# Patient Record
Sex: FEMALE | Race: BLACK OR AFRICAN AMERICAN | NOT HISPANIC OR LATINO | URBAN - METROPOLITAN AREA
[De-identification: names, ages, dates, MRNs, and addresses within clinical notes are randomized per-mention and may not be internally consistent; named-entity substitution may affect disease eponyms.]

---

## 2019-07-08 ENCOUNTER — EMERGENCY (EMERGENCY)
Facility: HOSPITAL | Age: 64
LOS: 0 days | Discharge: HOME | End: 2019-07-08
Attending: EMERGENCY MEDICINE
Payer: COMMERCIAL

## 2019-07-08 VITALS
TEMPERATURE: 98 F | RESPIRATION RATE: 20 BRPM | DIASTOLIC BLOOD PRESSURE: 68 MMHG | SYSTOLIC BLOOD PRESSURE: 153 MMHG | OXYGEN SATURATION: 99 % | HEART RATE: 81 BPM

## 2019-07-08 VITALS
WEIGHT: 270.07 LBS | HEART RATE: 87 BPM | TEMPERATURE: 100 F | SYSTOLIC BLOOD PRESSURE: 174 MMHG | DIASTOLIC BLOOD PRESSURE: 75 MMHG | RESPIRATION RATE: 24 BRPM | HEIGHT: 65 IN | OXYGEN SATURATION: 100 %

## 2019-07-08 DIAGNOSIS — Z91.013 ALLERGY TO SEAFOOD: ICD-10-CM

## 2019-07-08 DIAGNOSIS — R06.02 SHORTNESS OF BREATH: ICD-10-CM

## 2019-07-08 DIAGNOSIS — R09.89 OTHER SPECIFIED SYMPTOMS AND SIGNS INVOLVING THE CIRCULATORY AND RESPIRATORY SYSTEMS: ICD-10-CM

## 2019-07-08 DIAGNOSIS — J45.901 UNSPECIFIED ASTHMA WITH (ACUTE) EXACERBATION: ICD-10-CM

## 2019-07-08 LAB
ALBUMIN SERPL ELPH-MCNC: 3.5 G/DL — SIGNIFICANT CHANGE UP (ref 3.3–5)
ALP SERPL-CCNC: 119 U/L — SIGNIFICANT CHANGE UP (ref 40–120)
ALT FLD-CCNC: 24 U/L — SIGNIFICANT CHANGE UP (ref 12–78)
ANION GAP SERPL CALC-SCNC: 5 MMOL/L — SIGNIFICANT CHANGE UP (ref 5–17)
AST SERPL-CCNC: 22 U/L — SIGNIFICANT CHANGE UP (ref 15–37)
BASOPHILS # BLD AUTO: 0.09 K/UL — SIGNIFICANT CHANGE UP (ref 0–0.2)
BASOPHILS NFR BLD AUTO: 0.9 % — SIGNIFICANT CHANGE UP (ref 0–2)
BILIRUB SERPL-MCNC: 0.4 MG/DL — SIGNIFICANT CHANGE UP (ref 0.2–1.2)
BUN SERPL-MCNC: 10 MG/DL — SIGNIFICANT CHANGE UP (ref 7–23)
CALCIUM SERPL-MCNC: 9.3 MG/DL — SIGNIFICANT CHANGE UP (ref 8.5–10.1)
CHLORIDE SERPL-SCNC: 100 MMOL/L — SIGNIFICANT CHANGE UP (ref 96–108)
CO2 SERPL-SCNC: 33 MMOL/L — HIGH (ref 22–31)
CREAT SERPL-MCNC: 0.76 MG/DL — SIGNIFICANT CHANGE UP (ref 0.5–1.3)
EOSINOPHIL # BLD AUTO: 0.51 K/UL — HIGH (ref 0–0.5)
EOSINOPHIL NFR BLD AUTO: 5.2 % — SIGNIFICANT CHANGE UP (ref 0–6)
GLUCOSE SERPL-MCNC: 155 MG/DL — HIGH (ref 70–99)
HCT VFR BLD CALC: 41.7 % — SIGNIFICANT CHANGE UP (ref 34.5–45)
HGB BLD-MCNC: 13.1 G/DL — SIGNIFICANT CHANGE UP (ref 11.5–15.5)
IMM GRANULOCYTES NFR BLD AUTO: 0.6 % — SIGNIFICANT CHANGE UP (ref 0–1.5)
LYMPHOCYTES # BLD AUTO: 1.62 K/UL — SIGNIFICANT CHANGE UP (ref 1–3.3)
LYMPHOCYTES # BLD AUTO: 16.5 % — SIGNIFICANT CHANGE UP (ref 13–44)
MCHC RBC-ENTMCNC: 27.8 PG — SIGNIFICANT CHANGE UP (ref 27–34)
MCHC RBC-ENTMCNC: 31.4 GM/DL — LOW (ref 32–36)
MCV RBC AUTO: 88.5 FL — SIGNIFICANT CHANGE UP (ref 80–100)
MONOCYTES # BLD AUTO: 0.3 K/UL — SIGNIFICANT CHANGE UP (ref 0–0.9)
MONOCYTES NFR BLD AUTO: 3.1 % — SIGNIFICANT CHANGE UP (ref 2–14)
NEUTROPHILS # BLD AUTO: 7.22 K/UL — SIGNIFICANT CHANGE UP (ref 1.8–7.4)
NEUTROPHILS NFR BLD AUTO: 73.7 % — SIGNIFICANT CHANGE UP (ref 43–77)
NRBC # BLD: 0 /100 WBCS — SIGNIFICANT CHANGE UP (ref 0–0)
NT-PROBNP SERPL-SCNC: 85 PG/ML — SIGNIFICANT CHANGE UP (ref 0–125)
PLATELET # BLD AUTO: 202 K/UL — SIGNIFICANT CHANGE UP (ref 150–400)
POTASSIUM SERPL-MCNC: 3.7 MMOL/L — SIGNIFICANT CHANGE UP (ref 3.5–5.3)
POTASSIUM SERPL-SCNC: 3.7 MMOL/L — SIGNIFICANT CHANGE UP (ref 3.5–5.3)
PROT SERPL-MCNC: 8.7 GM/DL — HIGH (ref 6–8.3)
RBC # BLD: 4.71 M/UL — SIGNIFICANT CHANGE UP (ref 3.8–5.2)
RBC # FLD: 14.1 % — SIGNIFICANT CHANGE UP (ref 10.3–14.5)
SODIUM SERPL-SCNC: 138 MMOL/L — SIGNIFICANT CHANGE UP (ref 135–145)
TROPONIN I SERPL-MCNC: <.015 NG/ML — SIGNIFICANT CHANGE UP (ref 0.01–0.04)
WBC # BLD: 9.8 K/UL — SIGNIFICANT CHANGE UP (ref 3.8–10.5)
WBC # FLD AUTO: 9.8 K/UL — SIGNIFICANT CHANGE UP (ref 3.8–10.5)

## 2019-07-08 PROCEDURE — 93010 ELECTROCARDIOGRAM REPORT: CPT

## 2019-07-08 PROCEDURE — 99285 EMERGENCY DEPT VISIT HI MDM: CPT

## 2019-07-08 PROCEDURE — 71045 X-RAY EXAM CHEST 1 VIEW: CPT | Mod: 26

## 2019-07-08 RX ORDER — FLUTICASONE PROPIONATE AND SALMETEROL 50; 250 UG/1; UG/1
2 POWDER ORAL; RESPIRATORY (INHALATION)
Qty: 1 | Refills: 0
Start: 2019-07-08 | End: 2019-08-06

## 2019-07-08 RX ORDER — MAGNESIUM SULFATE 500 MG/ML
2 VIAL (ML) INJECTION ONCE
Refills: 0 | Status: COMPLETED | OUTPATIENT
Start: 2019-07-08 | End: 2019-07-08

## 2019-07-08 RX ORDER — IPRATROPIUM/ALBUTEROL SULFATE 18-103MCG
3 AEROSOL WITH ADAPTER (GRAM) INHALATION ONCE
Refills: 0 | Status: COMPLETED | OUTPATIENT
Start: 2019-07-08 | End: 2019-07-08

## 2019-07-08 RX ORDER — ALBUTEROL 90 UG/1
2 AEROSOL, METERED ORAL
Qty: 1 | Refills: 0
Start: 2019-07-08 | End: 2019-08-06

## 2019-07-08 RX ORDER — IPRATROPIUM/ALBUTEROL SULFATE 18-103MCG
3 AEROSOL WITH ADAPTER (GRAM) INHALATION
Refills: 0 | Status: COMPLETED | OUTPATIENT
Start: 2019-07-08 | End: 2019-07-08

## 2019-07-08 RX ORDER — ALBUTEROL 90 UG/1
3 AEROSOL, METERED ORAL
Qty: 540 | Refills: 0
Start: 2019-07-08 | End: 2019-08-06

## 2019-07-08 RX ADMIN — Medication 125 MILLIGRAM(S): at 11:06

## 2019-07-08 RX ADMIN — Medication 3 MILLILITER(S): at 14:39

## 2019-07-08 RX ADMIN — Medication 50 GRAM(S): at 11:12

## 2019-07-08 RX ADMIN — Medication 3 MILLILITER(S): at 11:29

## 2019-07-08 RX ADMIN — Medication 2 GRAM(S): at 12:00

## 2019-07-08 RX ADMIN — Medication 3 MILLILITER(S): at 11:43

## 2019-07-08 RX ADMIN — Medication 3 MILLILITER(S): at 11:02

## 2019-07-08 RX ADMIN — Medication 3 MILLILITER(S): at 14:18

## 2019-07-08 NOTE — ED PROVIDER NOTE - PROGRESS NOTE DETAILS
pt still with wheezing but improved, pt in no resp distress. able to walk entire ER without significant distress and O2 93-94%.

## 2019-07-08 NOTE — ED PROVIDER NOTE - CLINICAL SUMMARY MEDICAL DECISION MAKING FREE TEXT BOX
pt nontoxic, feeling better, wheezign much more improved, sat 95%.  pt given option for admission but does not want to stay. pt has nebulizer at home.

## 2019-07-08 NOTE — ED PROVIDER NOTE - PHYSICAL EXAMINATION
Gen: Alert, Well appearing. speaking full sentences  Head: NC, AT, PERRL, normal lids/conjunctiva   ENT: Bilateral TM WNL, patent oropharynx without erythema/exudate, uvula midline  Neck: supple, no tenderness/meningismus  Pulm: + diffuse wheeze/rhonchi  CV: RRR, no M/R/G, +dist pulses   Abd: soft, NT/ND, +BS, no guarding/rebound tenderness  Mskel: no edema/erythema/cyanosis   Skin: no rash, no bruising  Neuro: AAOx3, no sensory/motor deficits, CN 2-12 intact

## 2019-07-08 NOTE — ED ADULT NURSE NOTE - OBJECTIVE STATEMENT
BIBA for shortness of breath and wheezing since this am@ her MD's office and given multiple duoneb treatments with persistant SOB continues and bilateral wheezes noted with a  history of asthma,

## 2019-07-08 NOTE — ED PROVIDER NOTE - NSFOLLOWUPINSTRUCTIONS_ED_ALL_ED_FT
Follow up with your primary care doctor within the next 24-48 hours and bring copy of your results.  Return to the Emergency Department for worsening or persistent symptoms or any other concerns incl. chest pain, shortness of breath, dizziness, inability to tolerate oral intake.   Advance activity as tolerated.  Continue all previously prescribed medications as directed.

## 2019-07-08 NOTE — ED PROVIDER NOTE - OBJECTIVE STATEMENT
68yo female with pmh asthma (no ICU, no recent admissions), htn, presents with initial URI/congestion 4-5 days ago and sob but unable to see doctor as they were closed. taken otc mucinex and inhaler. however more sob with some yellow sputum and sent in by PMD. no fever, cp, palpitations, NV.     ROS: No fever/chills. No photophobia/eye pain/changes in vision, No ear pain/sore throat/dysphagia, No chest pain/palpitations. + SOB/cough. No abdominal pain, No N/V/D, no black/bloody bm. No dysuria/frequency/discharge, No headache. No Dizziness.  No rash.  No numbness/tingling/weakness.

## 2023-07-09 ENCOUNTER — NON-APPOINTMENT (OUTPATIENT)
Age: 68
End: 2023-07-09

## 2023-09-01 ENCOUNTER — INPATIENT (INPATIENT)
Facility: HOSPITAL | Age: 68
LOS: 1 days | Discharge: HOME | DRG: 202 | End: 2023-09-03
Attending: INTERNAL MEDICINE | Admitting: INTERNAL MEDICINE
Payer: MEDICARE

## 2023-09-01 VITALS
RESPIRATION RATE: 24 BRPM | DIASTOLIC BLOOD PRESSURE: 81 MMHG | SYSTOLIC BLOOD PRESSURE: 156 MMHG | HEART RATE: 108 BPM | TEMPERATURE: 98 F | HEIGHT: 65 IN | WEIGHT: 203.93 LBS | OXYGEN SATURATION: 92 %

## 2023-09-01 LAB
ALBUMIN SERPL ELPH-MCNC: 4 G/DL — SIGNIFICANT CHANGE UP (ref 3.3–5)
ALP SERPL-CCNC: 97 U/L — SIGNIFICANT CHANGE UP (ref 40–120)
ALT FLD-CCNC: 16 U/L — SIGNIFICANT CHANGE UP (ref 10–45)
ANION GAP SERPL CALC-SCNC: 13 MMOL/L — SIGNIFICANT CHANGE UP (ref 5–17)
AST SERPL-CCNC: 19 U/L — SIGNIFICANT CHANGE UP (ref 10–40)
BASE EXCESS BLDV CALC-SCNC: 11.7 MMOL/L — HIGH (ref -2–3)
BASOPHILS # BLD AUTO: 0.1 K/UL — SIGNIFICANT CHANGE UP (ref 0–0.2)
BASOPHILS NFR BLD AUTO: 1.3 % — SIGNIFICANT CHANGE UP (ref 0–2)
BILIRUB SERPL-MCNC: 0.5 MG/DL — SIGNIFICANT CHANGE UP (ref 0.2–1.2)
BUN SERPL-MCNC: 8 MG/DL — SIGNIFICANT CHANGE UP (ref 7–23)
CA-I SERPL-SCNC: 1.13 MMOL/L — LOW (ref 1.15–1.33)
CALCIUM SERPL-MCNC: 9.5 MG/DL — SIGNIFICANT CHANGE UP (ref 8.4–10.5)
CHLORIDE BLDV-SCNC: 96 MMOL/L — SIGNIFICANT CHANGE UP (ref 96–108)
CHLORIDE SERPL-SCNC: 94 MMOL/L — LOW (ref 96–108)
CO2 BLDV-SCNC: 41 MMOL/L — HIGH (ref 22–26)
CO2 SERPL-SCNC: 30 MMOL/L — SIGNIFICANT CHANGE UP (ref 22–31)
CREAT SERPL-MCNC: 0.68 MG/DL — SIGNIFICANT CHANGE UP (ref 0.5–1.3)
EGFR: 96 ML/MIN/1.73M2 — SIGNIFICANT CHANGE UP
EOSINOPHIL # BLD AUTO: 0.98 K/UL — HIGH (ref 0–0.5)
EOSINOPHIL NFR BLD AUTO: 12.7 % — HIGH (ref 0–6)
GAS PNL BLDV: 134 MMOL/L — LOW (ref 136–145)
GAS PNL BLDV: SIGNIFICANT CHANGE UP
GAS PNL BLDV: SIGNIFICANT CHANGE UP
GLUCOSE BLDC GLUCOMTR-MCNC: 196 MG/DL — HIGH (ref 70–99)
GLUCOSE BLDV-MCNC: 191 MG/DL — HIGH (ref 70–99)
GLUCOSE SERPL-MCNC: 184 MG/DL — HIGH (ref 70–99)
HCO3 BLDV-SCNC: 39 MMOL/L — HIGH (ref 22–29)
HCT VFR BLD CALC: 44.7 % — SIGNIFICANT CHANGE UP (ref 34.5–45)
HCT VFR BLDA CALC: 43 % — SIGNIFICANT CHANGE UP (ref 34.5–46.5)
HGB BLD CALC-MCNC: 14.3 G/DL — SIGNIFICANT CHANGE UP (ref 11.7–16.1)
HGB BLD-MCNC: 14.1 G/DL — SIGNIFICANT CHANGE UP (ref 11.5–15.5)
IMM GRANULOCYTES NFR BLD AUTO: 0.7 % — SIGNIFICANT CHANGE UP (ref 0–0.9)
LACTATE BLDV-MCNC: 1.6 MMOL/L — SIGNIFICANT CHANGE UP (ref 0.5–2)
LYMPHOCYTES # BLD AUTO: 1.49 K/UL — SIGNIFICANT CHANGE UP (ref 1–3.3)
LYMPHOCYTES # BLD AUTO: 19.4 % — SIGNIFICANT CHANGE UP (ref 13–44)
MCHC RBC-ENTMCNC: 27.8 PG — SIGNIFICANT CHANGE UP (ref 27–34)
MCHC RBC-ENTMCNC: 31.5 GM/DL — LOW (ref 32–36)
MCV RBC AUTO: 88 FL — SIGNIFICANT CHANGE UP (ref 80–100)
MONOCYTES # BLD AUTO: 0.52 K/UL — SIGNIFICANT CHANGE UP (ref 0–0.9)
MONOCYTES NFR BLD AUTO: 6.8 % — SIGNIFICANT CHANGE UP (ref 2–14)
NEUTROPHILS # BLD AUTO: 4.55 K/UL — SIGNIFICANT CHANGE UP (ref 1.8–7.4)
NEUTROPHILS NFR BLD AUTO: 59.1 % — SIGNIFICANT CHANGE UP (ref 43–77)
NRBC # BLD: 0 /100 WBCS — SIGNIFICANT CHANGE UP (ref 0–0)
PCO2 BLDV: 60 MMHG — HIGH (ref 39–42)
PH BLDV: 7.42 — SIGNIFICANT CHANGE UP (ref 7.32–7.43)
PLATELET # BLD AUTO: 246 K/UL — SIGNIFICANT CHANGE UP (ref 150–400)
PO2 BLDV: 38 MMHG — SIGNIFICANT CHANGE UP (ref 25–45)
POTASSIUM BLDV-SCNC: 3.1 MMOL/L — LOW (ref 3.5–5.1)
POTASSIUM SERPL-MCNC: 3.3 MMOL/L — LOW (ref 3.5–5.3)
POTASSIUM SERPL-SCNC: 3.3 MMOL/L — LOW (ref 3.5–5.3)
PROT SERPL-MCNC: 8 G/DL — SIGNIFICANT CHANGE UP (ref 6–8.3)
RAPID RVP RESULT: SIGNIFICANT CHANGE UP
RBC # BLD: 5.08 M/UL — SIGNIFICANT CHANGE UP (ref 3.8–5.2)
RBC # FLD: 13.9 % — SIGNIFICANT CHANGE UP (ref 10.3–14.5)
SAO2 % BLDV: 63.8 % — LOW (ref 67–88)
SARS-COV-2 RNA SPEC QL NAA+PROBE: SIGNIFICANT CHANGE UP
SODIUM SERPL-SCNC: 137 MMOL/L — SIGNIFICANT CHANGE UP (ref 135–145)
WBC # BLD: 7.69 K/UL — SIGNIFICANT CHANGE UP (ref 3.8–10.5)
WBC # FLD AUTO: 7.69 K/UL — SIGNIFICANT CHANGE UP (ref 3.8–10.5)

## 2023-09-01 PROCEDURE — 99223 1ST HOSP IP/OBS HIGH 75: CPT | Mod: FS

## 2023-09-01 PROCEDURE — 71046 X-RAY EXAM CHEST 2 VIEWS: CPT | Mod: 26

## 2023-09-01 RX ORDER — ALBUTEROL 90 UG/1
2.5 AEROSOL, METERED ORAL ONCE
Refills: 0 | Status: COMPLETED | OUTPATIENT
Start: 2023-09-01 | End: 2023-09-01

## 2023-09-01 RX ORDER — SODIUM CHLORIDE 9 MG/ML
500 INJECTION INTRAMUSCULAR; INTRAVENOUS; SUBCUTANEOUS ONCE
Refills: 0 | Status: COMPLETED | OUTPATIENT
Start: 2023-09-01 | End: 2023-09-01

## 2023-09-01 RX ORDER — IPRATROPIUM/ALBUTEROL SULFATE 18-103MCG
3 AEROSOL WITH ADAPTER (GRAM) INHALATION
Refills: 0 | Status: COMPLETED | OUTPATIENT
Start: 2023-09-01 | End: 2023-09-01

## 2023-09-01 RX ORDER — IPRATROPIUM/ALBUTEROL SULFATE 18-103MCG
3 AEROSOL WITH ADAPTER (GRAM) INHALATION EVERY 4 HOURS
Refills: 0 | Status: COMPLETED | OUTPATIENT
Start: 2023-09-01 | End: 2023-09-02

## 2023-09-01 RX ORDER — POTASSIUM CHLORIDE 20 MEQ
40 PACKET (EA) ORAL ONCE
Refills: 0 | Status: COMPLETED | OUTPATIENT
Start: 2023-09-01 | End: 2023-09-01

## 2023-09-01 RX ORDER — MAGNESIUM SULFATE 500 MG/ML
2 VIAL (ML) INJECTION ONCE
Refills: 0 | Status: COMPLETED | OUTPATIENT
Start: 2023-09-01 | End: 2023-09-01

## 2023-09-01 RX ADMIN — Medication 3 MILLILITER(S): at 13:49

## 2023-09-01 RX ADMIN — Medication 40 MILLIEQUIVALENT(S): at 11:43

## 2023-09-01 RX ADMIN — Medication 80 MILLIGRAM(S): at 18:45

## 2023-09-01 RX ADMIN — Medication 50 MILLIGRAM(S): at 10:43

## 2023-09-01 RX ADMIN — SODIUM CHLORIDE 500 MILLILITER(S): 9 INJECTION INTRAMUSCULAR; INTRAVENOUS; SUBCUTANEOUS at 20:57

## 2023-09-01 RX ADMIN — Medication 150 GRAM(S): at 14:58

## 2023-09-01 RX ADMIN — ALBUTEROL 2.5 MILLIGRAM(S): 90 AEROSOL, METERED ORAL at 13:44

## 2023-09-01 RX ADMIN — Medication 3 MILLILITER(S): at 10:43

## 2023-09-01 RX ADMIN — Medication 3 MILLILITER(S): at 11:05

## 2023-09-01 RX ADMIN — Medication 3 MILLILITER(S): at 20:57

## 2023-09-01 NOTE — ED ADULT NURSE REASSESSMENT NOTE - NS ED NURSE REASSESS COMMENT FT1
Pt received from NEIL Lovett. Pt A&O x 4. Pt in CDU for IV steroids, pulse oxymetry monitoring and frequent vitals signs . Pt denies any SOB and wheezing at this time. No acute respiratory distress noted. V/S stable, pt afebrile,  IV in place, patent and free of signs of infiltration. Pt resting in bed. Safety & comfort measures maintained. Call bell in reach. Will continue to monitor. Current SPO2 97% on 2L nasal cannula.

## 2023-09-01 NOTE — ED ADULT NURSE NOTE - NS ED NURSE DISCH DISPOSITION
Discharge instructions given. Patient verbalizes understanding of same.
Discharged in stable condition via Wheelchair to Home with
volunteer. All belongings sent with pt. Admitted

## 2023-09-01 NOTE — ED PROVIDER NOTE - PHYSICAL EXAMINATION
CONSTITUTIONAL: Well appearing and in no apparent distress.  ENT: Airway patent, moist mucous membranes.   EYES: Pupils equal, round and reactive to light. EOMI. Conjunctiva normal appearing.   CARDIAC: Normal rate, regular rhythm.  Heart sounds S1, S2.    RESPIRATORY: Diffuse exp wheezing. No tachypnea. Speaking in full sentences. O2 sat 92% on RA.  GASTROINTESTINAL: Abdomen soft, non-tender, not distended.  MUSCULOSKELETAL: Spine appears normal.  NEUROLOGICAL: Alert and oriented x3, no focal deficits, no motor or sensory deficits. 5/5 muscle strength throughout.  SKIN: Skin normal color, warm, dry and intact.   PSYCHIATRIC: Normal mood and affect.

## 2023-09-01 NOTE — ED CDU PROVIDER INITIAL DAY NOTE - OBJECTIVE STATEMENT
67 yo F with a PMH of asthma (never intubated or hospitalized) p/w SOB, wheezing x Monday. Pt states she was in a cab and the air freshener was very strong, immediately felt it trigger her asthma. Since then has had sxs. Has been using her inhaler w/o relief. +Chest tightness, no pain. Denies nausea, vomiting, fever, chills, congestion, sore throat, cough, palpitations, leg pain/swelling, abd pain, headache, dizziness, weakness. No recent illness or sick contacts. Non smoker.  In ED, K 3.3 repleted orally, labs otherwise nonactionable, cxr clear. CDU for continuous pulse ox, iv steroids, prn nebs, frequent reassessments.

## 2023-09-01 NOTE — ED CDU PROVIDER INITIAL DAY NOTE - PROGRESS NOTE DETAILS
Patient reports feeling improved since coming to the ER.   Lungs with wheezing b/l. Speaking in full sentences.   Will continue with neb treatments.   Patient states that she was suppose to follow up with pulm tomorrow although states that she was called and appointment had to be moved, requesting alternate pulm follow up. - Krystina Mirza PA-C

## 2023-09-01 NOTE — ED PROVIDER NOTE - ATTENDING APP SHARED VISIT CONTRIBUTION OF CARE
attending Leon: 68yF h/o asthma (never intubated or hospitalized) p/w SOB, wheezing since Monday. Has been using her inhaler w/o relief. +Chest tightness. Denies recent illness. Exam as above. Asthma exacerbation. Will obtain ekg, labs, nebs and steroids, cxr, RVP, reassess

## 2023-09-01 NOTE — ED ADULT NURSE REASSESSMENT NOTE - NS ED NURSE REASSESS COMMENT FT1
Break coverage RN: Report received from NEIL Gutierrez in purple for pt. Pt transferred to CDU room 41. Medicated as per PA order. Pt observed sitting up in stretcher conversing with RN without difficulty. Breathing spontaneous and unlabored with pulse ox >95% on room air. Sitting at bedside, provided food tray. No acute distress noted. Call bell within reach.

## 2023-09-01 NOTE — ED CDU PROVIDER INITIAL DAY NOTE - ATTENDING APP SHARED VISIT CONTRIBUTION OF CARE
attending Leon: pt with asthma exacerbation. Plan for CDU for steroids, nebs, continuous pulse ox, frequent reassessments

## 2023-09-01 NOTE — ED ADULT TRIAGE NOTE - CHIEF COMPLAINT QUOTE
wheezing sob 02 sat 92 -93 r/a Exposed to cigarette smoking  Rescue inhaler not working Has never been intubated  Admitted July for similar sx

## 2023-09-01 NOTE — ED PROVIDER NOTE - OBJECTIVE STATEMENT
67 yo F with a PMH of asthma (never intubated or hospitalized) p/w SOB, wheezing x Monday. Pt states she was in a cab and the air freshener was very strong, immediately felt it trigger her asthma. Since then has had sxs. Has been using her inhaler w/o relief. +Chest tightness, no pain. Denies nausea, vomiting, fever, chills, congestion, sore throat, cough, palpitations, leg pain/swelling, abd pain, headache, dizziness, weakness. No recent illness or sick contacts. Non smoker.

## 2023-09-01 NOTE — ED ADULT NURSE NOTE - OBJECTIVE STATEMENT
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

## 2023-09-02 DIAGNOSIS — J45.901 UNSPECIFIED ASTHMA WITH (ACUTE) EXACERBATION: ICD-10-CM

## 2023-09-02 LAB
ANION GAP SERPL CALC-SCNC: 12 MMOL/L — SIGNIFICANT CHANGE UP (ref 5–17)
BASE EXCESS BLDV CALC-SCNC: 9.2 MMOL/L — HIGH (ref -2–3)
BUN SERPL-MCNC: 16 MG/DL — SIGNIFICANT CHANGE UP (ref 7–23)
CA-I SERPL-SCNC: 1.21 MMOL/L — SIGNIFICANT CHANGE UP (ref 1.15–1.33)
CALCIUM SERPL-MCNC: 9.6 MG/DL — SIGNIFICANT CHANGE UP (ref 8.4–10.5)
CHLORIDE BLDV-SCNC: 101 MMOL/L — SIGNIFICANT CHANGE UP (ref 96–108)
CHLORIDE SERPL-SCNC: 98 MMOL/L — SIGNIFICANT CHANGE UP (ref 96–108)
CO2 BLDV-SCNC: 38 MMOL/L — HIGH (ref 22–26)
CO2 SERPL-SCNC: 30 MMOL/L — SIGNIFICANT CHANGE UP (ref 22–31)
CREAT SERPL-MCNC: 0.79 MG/DL — SIGNIFICANT CHANGE UP (ref 0.5–1.3)
EGFR: 82 ML/MIN/1.73M2 — SIGNIFICANT CHANGE UP
GAS PNL BLDV: 135 MMOL/L — LOW (ref 136–145)
GAS PNL BLDV: SIGNIFICANT CHANGE UP
GLUCOSE BLDV-MCNC: 218 MG/DL — HIGH (ref 70–99)
GLUCOSE SERPL-MCNC: 207 MG/DL — HIGH (ref 70–99)
HCO3 BLDV-SCNC: 36 MMOL/L — HIGH (ref 22–29)
HCT VFR BLDA CALC: 45 % — SIGNIFICANT CHANGE UP (ref 34.5–46.5)
HGB BLD CALC-MCNC: 15 G/DL — SIGNIFICANT CHANGE UP (ref 11.7–16.1)
LACTATE BLDV-MCNC: 2.1 MMOL/L — HIGH (ref 0.5–2)
PCO2 BLDV: 57 MMHG — HIGH (ref 39–42)
PH BLDV: 7.41 — SIGNIFICANT CHANGE UP (ref 7.32–7.43)
PO2 BLDV: 51 MMHG — HIGH (ref 25–45)
POTASSIUM BLDV-SCNC: 4 MMOL/L — SIGNIFICANT CHANGE UP (ref 3.5–5.1)
POTASSIUM SERPL-MCNC: 3.9 MMOL/L — SIGNIFICANT CHANGE UP (ref 3.5–5.3)
POTASSIUM SERPL-SCNC: 3.9 MMOL/L — SIGNIFICANT CHANGE UP (ref 3.5–5.3)
SAO2 % BLDV: 85.2 % — SIGNIFICANT CHANGE UP (ref 67–88)
SODIUM SERPL-SCNC: 140 MMOL/L — SIGNIFICANT CHANGE UP (ref 135–145)

## 2023-09-02 PROCEDURE — 99233 SBSQ HOSP IP/OBS HIGH 50: CPT | Mod: FS

## 2023-09-02 RX ORDER — LORATADINE 10 MG/1
10 TABLET ORAL DAILY
Refills: 0 | Status: DISCONTINUED | OUTPATIENT
Start: 2023-09-02 | End: 2023-09-03

## 2023-09-02 RX ORDER — BUDESONIDE AND FORMOTEROL FUMARATE DIHYDRATE 160; 4.5 UG/1; UG/1
2 AEROSOL RESPIRATORY (INHALATION)
Refills: 0 | Status: DISCONTINUED | OUTPATIENT
Start: 2023-09-02 | End: 2023-09-03

## 2023-09-02 RX ORDER — MONTELUKAST 4 MG/1
10 TABLET, CHEWABLE ORAL AT BEDTIME
Refills: 0 | Status: DISCONTINUED | OUTPATIENT
Start: 2023-09-02 | End: 2023-09-03

## 2023-09-02 RX ORDER — FUROSEMIDE 40 MG
20 TABLET ORAL ONCE
Refills: 0 | Status: COMPLETED | OUTPATIENT
Start: 2023-09-02 | End: 2023-09-02

## 2023-09-02 RX ORDER — FAMOTIDINE 10 MG/ML
20 INJECTION INTRAVENOUS
Refills: 0 | Status: DISCONTINUED | OUTPATIENT
Start: 2023-09-02 | End: 2023-09-03

## 2023-09-02 RX ORDER — IPRATROPIUM/ALBUTEROL SULFATE 18-103MCG
3 AEROSOL WITH ADAPTER (GRAM) INHALATION EVERY 6 HOURS
Refills: 0 | Status: DISCONTINUED | OUTPATIENT
Start: 2023-09-02 | End: 2023-09-03

## 2023-09-02 RX ADMIN — Medication 3 MILLILITER(S): at 15:28

## 2023-09-02 RX ADMIN — LORATADINE 10 MILLIGRAM(S): 10 TABLET ORAL at 22:16

## 2023-09-02 RX ADMIN — Medication 40 MILLIGRAM(S): at 09:38

## 2023-09-02 RX ADMIN — Medication 40 MILLIGRAM(S): at 17:59

## 2023-09-02 RX ADMIN — MONTELUKAST 10 MILLIGRAM(S): 4 TABLET, CHEWABLE ORAL at 22:17

## 2023-09-02 RX ADMIN — Medication 3 MILLILITER(S): at 05:32

## 2023-09-02 RX ADMIN — Medication 40 MILLIGRAM(S): at 22:17

## 2023-09-02 RX ADMIN — Medication 80 MILLIGRAM(S): at 00:33

## 2023-09-02 NOTE — ED CDU PROVIDER SUBSEQUENT DAY NOTE - PROGRESS NOTE DETAILS
Post neb treatment peak flow 170.  Patient reports improving symptoms.   On room air O2 sat low 90s, now back on 2L NC. - Krystina Mirza PA-C Pt resting comfortably. NAD. No complaints. Pt endorses improved breathing however O2 levels low 90s on RA, will continue with duonebs, solumedrol 40mg Q8 hrs will cont to monitor. pt still with O2  inlow 90s, lungs still with diffuse inspiratory wheezes and very tight, will admit. agree with above PA note, patient requires admission for ongoing bronchospasm and borderline hypoxia.

## 2023-09-02 NOTE — ED CDU PROVIDER SUBSEQUENT DAY NOTE - HISTORY
No interval changes since initial CDU provider note. Pt without complaint. NAD VSS. O2 sat in the mid-high 90s on room air. Plan to continue neb treatments in the setting of asthma exacerbation. - ALFREDO Mirza No interval changes since initial CDU provider note. Pt without complaint. NAD VSS. O2 sat in the mid-high 90s on NC. Plan to continue neb treatments in the setting of asthma exacerbation. - ALFREDO Mirza

## 2023-09-02 NOTE — H&P ADULT - HISTORY OF PRESENT ILLNESS
67 yo F with a PMH of asthma (never intubated nor hospitalized) p/w SOB, wheezing since 8/28. Pt states she was in a cab and the air freshener was very strong, immediately felt it trigger her asthma. Since then has had these sxs. Has been using her inhaler w/o relief. +Chest tightness, no pain. Denies nausea, vomiting, fever, chills, congestion, sore throat, cough, palpitations, leg pain/swelling, abd pain, headache, dizziness, weakness. No recent illness or sick contacts. Non smoker.ptn was admitted on 9/1 to CDU, placed on duonebs and IV Medrol, but remains hypoxic and bronchospastic the day after.Chest film without focal infiltrates.        69 yo F with a PMH of asthma (never intubated nor hospitalized) p/w SOB, wheezing since 8/28. Pt states she was in a cab and the air freshener was very strong, immediately felt it trigger her asthma. Since then has had these sxs. Has been using her inhaler w/o relief. +Chest tightness, no pain. Denies nausea, vomiting, fever, chills, congestion, sore throat, cough, palpitations, leg pain/swelling, abd pain, headache, dizziness, weakness. No recent illness or sick contacts. Non smoker.ptn was admitted on 9/1 to CDU, placed on duonebs and IV Medrol, but remains hypoxic and bronchospastic the day after. Chest film without focal infiltrates. but sounds a little wet today and elevated proBNP

## 2023-09-02 NOTE — ED CDU PROVIDER DISPOSITION NOTE - ATTENDING APP SHARED VISIT CONTRIBUTION OF CARE
Attending MD Ball:   I personally have seen and examined this patient. I have performed a substantive portion of the visit including all aspects of the medical decision making.   Physician assistant note reviewed and agree on plan of care and except where noted.                *The above represents an initial assessment/impression. Please refer to progress notes for potential changes in patient clinical course*

## 2023-09-02 NOTE — ED CDU PROVIDER SUBSEQUENT DAY NOTE - PHYSICAL EXAMINATION
CONSTITUTIONAL: Well appearing and in no apparent distress.  	ENT: Airway patent, moist mucous membranes.   	EYES: Conjunctiva normal appearing.   	CARDIAC: Normal rate, regular rhythm.  Heart sounds S1, S2.    	RESPIRATORY: Diffuse exp wheezing. No tachypnea. Speaking in full sentences.   	GASTROINTESTINAL: Abdomen soft, non-tender  	MUSCULOSKELETAL: Spine appears normal.  	NEUROLOGICAL: Alert and oriented x3, moving all extremities, clear speech, follows commands    	SKIN: Skin normal color, warm, dry and intact. No visible rashes   PSYCHIATRIC: Normal mood and affect.

## 2023-09-02 NOTE — ED CDU PROVIDER SUBSEQUENT DAY NOTE - CLINICAL SUMMARY MEDICAL DECISION MAKING FREE TEXT BOX
Attending MD Ball: 68-year-old woman with a history of asthma observed overnight for suspected asthma exacerbation.  Receiving bronchodilators IV steroids.  Chest film without focal infiltrates.  Chest x-ray was read as negative however on my review I question whether or not there is some mild pulmonary vascular congestion.  Patient this morning is feeling overall better.  Pulmonary examination this morning with diminished aeration and scattered wheezes.  Oxygen saturation room air 93%.  No appreciable lower extremity edema.    Patient with ongoing reactive airway disease.  We will continue bronchodilators IV steroids.  We will add on BNP to exclude cardiogenic pulmonary edema however clinically this is less likely.  Plan to reassess in the afternoon if patient continues to be bronchospastic and borderline hypoxic will require admission.  We will continue patient on nasal cannula oxygen supplementation.

## 2023-09-02 NOTE — ED ADULT NURSE REASSESSMENT NOTE - NSFALLUNIVINTERV_ED_ALL_ED
Bed/Stretcher in lowest position, wheels locked, appropriate side rails in place/Call bell, personal items and telephone in reach/Instruct patient to call for assistance before getting out of bed/chair/stretcher/Non-slip footwear applied when patient is off stretcher/[place] to call system/Physically safe environment - no spills, clutter or unnecessary equipment/Purposeful proactive rounding/Room/bathroom lighting operational, light cord in reach

## 2023-09-02 NOTE — H&P ADULT - NSHPPHYSICALEXAM_GEN_ALL_CORE
T(C): 36.7 (09-02-23 @ 16:21), Max: 36.7 (09-02-23 @ 16:21)  HR: 92 (09-02-23 @ 16:21) (89 - 110)  BP: 138/60 (09-02-23 @ 16:21) (128/74 - 138/60)  RR: 19 (09-02-23 @ 16:21) (19 - 20)  SpO2: 98% (09-02-23 @ 16:21) (96% - 100%)    PHYSICAL EXAM:  GENERAL: NAD, well-developed  HEAD:  Atraumatic, Normocephalic  EYES: EOMI, PERRLA, conjunctiva and sclera clear  NECK: Supple, No JVD  CHEST/LUNG: b/l exp wheeze  HEART: Regular rate and rhythm; No murmurs, rubs, or gallops  ABDOMEN: Soft, Nontender, Nondistended; Bowel sounds present  EXTREMITIES:  2+ Peripheral Pulses, No clubbing, cyanosis, or edema  PSYCH: AAOx3  NEUROLOGY: non-focal  SKIN: No rashes or lesions

## 2023-09-02 NOTE — H&P ADULT - ASSESSMENT
69 yo F with a PMH of asthma (never intubated nor hospitalized) p/w SOB, wheezing since 8/28. Pt states she was in a cab and the air freshener was very strong, immediately felt it trigger her asthma. Since then has had these sxs. Has been using her inhaler w/o relief. +Chest tightness, no pain. Denies nausea, vomiting, fever, chills, congestion, sore throat, cough, palpitations, leg pain/swelling, abd pain, headache, dizziness, weakness. No recent illness or sick contacts. Non smoker.ptn was admitted on 9/1 to CDU, placed on duonebs and IV Medrol, but remains hypoxic and bronchospastic the day after.Chest film without focal infiltrates.. but sounds a little wet today and elevated proBNP    PLAN:  Asthmaic exacerbation 2/2 allergic reaction to air freshener   will cont MEDROL IV, add claritin, pepcid and singulair  start Symbicort and duonebs  give a 20 mg IV Lasix dose x 1 now  get tte  pulm consult called  dvt ppx w scd

## 2023-09-02 NOTE — PATIENT PROFILE ADULT - FALL HARM RISK - HARM RISK INTERVENTIONS

## 2023-09-02 NOTE — ED CDU PROVIDER DISPOSITION NOTE - WR ORDER NAME 1
Xray Chest 2 Views PA/Lat
bilateral upper extremity Active ROM was WFL (within functional limits)/bilateral  lower extremity Active ROM was WFL (within functional limits)

## 2023-09-02 NOTE — ED ADULT NURSE REASSESSMENT NOTE - NS ED NURSE REASSESS COMMENT FT1
07.00 Am Received the Pt from  NEIL Andrew . Pt is Observed for Br Asthma  . Received the Pt A&OX 4 obeys commands Nuris N/V/D fever chills cp now  Comfort care & safety measures continued  IV site looks clean & dry no signs of infiltration noted pt denies  pain IV site .  Pt is advised to call for help  call bell with in the reach pt verbalized the understanding .  pending CDU  MD petit . GCS 15/15 A&OX 4 PERRLA  size 3 Strong upper & lower extremities steady gait   No facial droop  No Hand Leg drop denies numbness tingling  Pt is wheezing bilaterally  not in respiratory distress . Due meds given  .Continue to monitor

## 2023-09-02 NOTE — ED CDU PROVIDER SUBSEQUENT DAY NOTE - NS ED ROS FT
Constitutional: No fever or chills  Eyes: No visual changes, no eye pain   CV: +chest tightness   Resp: +SOB + cough  GI: No abd pain. No nausea or vomiting. No diarrhea.  : No dysuria, hematuria.   MSK: No musculoskeletal pain  Skin: No rash  Psych: No complaints   Neuro: No headache. No numbness or tingling. No weakness.  Endo: Denies known diabetes

## 2023-09-03 ENCOUNTER — TRANSCRIPTION ENCOUNTER (OUTPATIENT)
Age: 68
End: 2023-09-03

## 2023-09-03 VITALS
SYSTOLIC BLOOD PRESSURE: 159 MMHG | HEART RATE: 90 BPM | RESPIRATION RATE: 19 BRPM | TEMPERATURE: 98 F | OXYGEN SATURATION: 95 % | DIASTOLIC BLOOD PRESSURE: 72 MMHG

## 2023-09-03 LAB
HCV AB S/CO SERPL IA: 0.1 S/CO — SIGNIFICANT CHANGE UP (ref 0–0.99)
HCV AB SERPL-IMP: SIGNIFICANT CHANGE UP

## 2023-09-03 PROCEDURE — 85018 HEMOGLOBIN: CPT

## 2023-09-03 PROCEDURE — 96376 TX/PRO/DX INJ SAME DRUG ADON: CPT

## 2023-09-03 PROCEDURE — 96374 THER/PROPH/DIAG INJ IV PUSH: CPT

## 2023-09-03 PROCEDURE — 94640 AIRWAY INHALATION TREATMENT: CPT

## 2023-09-03 PROCEDURE — 93005 ELECTROCARDIOGRAM TRACING: CPT

## 2023-09-03 PROCEDURE — 82962 GLUCOSE BLOOD TEST: CPT

## 2023-09-03 PROCEDURE — 84295 ASSAY OF SERUM SODIUM: CPT

## 2023-09-03 PROCEDURE — G0378: CPT

## 2023-09-03 PROCEDURE — 36415 COLL VENOUS BLD VENIPUNCTURE: CPT

## 2023-09-03 PROCEDURE — 83880 ASSAY OF NATRIURETIC PEPTIDE: CPT

## 2023-09-03 PROCEDURE — 71046 X-RAY EXAM CHEST 2 VIEWS: CPT

## 2023-09-03 PROCEDURE — 82435 ASSAY OF BLOOD CHLORIDE: CPT

## 2023-09-03 PROCEDURE — 84132 ASSAY OF SERUM POTASSIUM: CPT

## 2023-09-03 PROCEDURE — 85014 HEMATOCRIT: CPT

## 2023-09-03 PROCEDURE — 0225U NFCT DS DNA&RNA 21 SARSCOV2: CPT

## 2023-09-03 PROCEDURE — 86803 HEPATITIS C AB TEST: CPT

## 2023-09-03 PROCEDURE — 80053 COMPREHEN METABOLIC PANEL: CPT

## 2023-09-03 PROCEDURE — 82803 BLOOD GASES ANY COMBINATION: CPT

## 2023-09-03 PROCEDURE — 99285 EMERGENCY DEPT VISIT HI MDM: CPT | Mod: 25

## 2023-09-03 PROCEDURE — 96375 TX/PRO/DX INJ NEW DRUG ADDON: CPT

## 2023-09-03 PROCEDURE — 80048 BASIC METABOLIC PNL TOTAL CA: CPT

## 2023-09-03 PROCEDURE — 85025 COMPLETE CBC W/AUTO DIFF WBC: CPT

## 2023-09-03 PROCEDURE — 82947 ASSAY GLUCOSE BLOOD QUANT: CPT

## 2023-09-03 PROCEDURE — 83036 HEMOGLOBIN GLYCOSYLATED A1C: CPT

## 2023-09-03 PROCEDURE — 83605 ASSAY OF LACTIC ACID: CPT

## 2023-09-03 PROCEDURE — 82330 ASSAY OF CALCIUM: CPT

## 2023-09-03 RX ORDER — BUDESONIDE AND FORMOTEROL FUMARATE DIHYDRATE 160; 4.5 UG/1; UG/1
2 AEROSOL RESPIRATORY (INHALATION)
Qty: 1 | Refills: 0
Start: 2023-09-03 | End: 2023-10-02

## 2023-09-03 RX ORDER — AMLODIPINE BESYLATE 2.5 MG/1
0 TABLET ORAL
Refills: 0 | DISCHARGE

## 2023-09-03 RX ORDER — HYDROCHLOROTHIAZIDE 25 MG
1 TABLET ORAL
Refills: 0 | DISCHARGE

## 2023-09-03 RX ORDER — LORATADINE 10 MG/1
1 TABLET ORAL
Qty: 0 | Refills: 0 | DISCHARGE
Start: 2023-09-03

## 2023-09-03 RX ORDER — MONTELUKAST 4 MG/1
1 TABLET, CHEWABLE ORAL
Refills: 0 | DISCHARGE

## 2023-09-03 RX ORDER — AMLODIPINE BESYLATE 2.5 MG/1
1 TABLET ORAL
Refills: 0 | DISCHARGE

## 2023-09-03 RX ORDER — FAMOTIDINE 10 MG/ML
1 INJECTION INTRAVENOUS
Qty: 0 | Refills: 0 | DISCHARGE
Start: 2023-09-03

## 2023-09-03 RX ORDER — HYDROCHLOROTHIAZIDE 25 MG
0 TABLET ORAL
Refills: 0 | DISCHARGE

## 2023-09-03 RX ADMIN — Medication 3 MILLILITER(S): at 11:26

## 2023-09-03 RX ADMIN — Medication 3 MILLILITER(S): at 05:30

## 2023-09-03 RX ADMIN — Medication 3 MILLILITER(S): at 00:03

## 2023-09-03 RX ADMIN — LORATADINE 10 MILLIGRAM(S): 10 TABLET ORAL at 11:26

## 2023-09-03 RX ADMIN — BUDESONIDE AND FORMOTEROL FUMARATE DIHYDRATE 2 PUFF(S): 160; 4.5 AEROSOL RESPIRATORY (INHALATION) at 17:22

## 2023-09-03 RX ADMIN — FAMOTIDINE 20 MILLIGRAM(S): 10 INJECTION INTRAVENOUS at 17:22

## 2023-09-03 RX ADMIN — Medication 3 MILLILITER(S): at 17:22

## 2023-09-03 RX ADMIN — FAMOTIDINE 20 MILLIGRAM(S): 10 INJECTION INTRAVENOUS at 05:30

## 2023-09-03 RX ADMIN — Medication 40 MILLIGRAM(S): at 13:24

## 2023-09-03 RX ADMIN — BUDESONIDE AND FORMOTEROL FUMARATE DIHYDRATE 2 PUFF(S): 160; 4.5 AEROSOL RESPIRATORY (INHALATION) at 05:31

## 2023-09-03 RX ADMIN — Medication 40 MILLIGRAM(S): at 05:30

## 2023-09-03 NOTE — DISCHARGE NOTE PROVIDER - NSDCCPCAREPLAN_GEN_ALL_CORE_FT
PRINCIPAL DISCHARGE DIAGNOSIS  Diagnosis: Acute asthma exacerbation  Assessment and Plan of Treatment: Medically cleared for discharge on prednisone 40mg for 5 more days, Symbicort, Singulair.  Please follow up with pulmonologist Dr Florez for pulmonary function testing.   Please follow up with your PCP for routine echocardiogram.

## 2023-09-03 NOTE — DISCHARGE NOTE PROVIDER - YES NO FOR MLM POSITIVE OR NEGATIVE COVID RESULT
, The resident's documentation has been prepared under my direction and personally reviewed by me in its entirety. I confirm that the note above accurately reflects all work, treatment, procedures, and medical decision making performed by me.

## 2023-09-03 NOTE — CONSULT NOTE ADULT - SUBJECTIVE AND OBJECTIVE BOX
09-03-23 @ 15:32    Patient is a 68y old  Female who presents with a chief complaint of     HPI:   69 yo F with a PMH of asthma (never intubated nor hospitalized) p/w SOB, wheezing since 8/28. Pt states she was in a cab and the air freshener was very strong, immediately felt it trigger her asthma. Since then has had these sxs. Has been using her inhaler w/o relief. +Chest tightness, no pain. Denies nausea, vomiting, fever, chills, congestion, sore throat, cough, palpitations, leg pain/swelling, abd pain, headache, dizziness, weakness. No recent illness or sick contacts. Non smoker.ptn was admitted on 9/1 to CDU, placed on duonebs and IV Medrol, but remains hypoxic and bronchospastic the day after. Chest film without focal infiltrates. but sounds a little wet today and elevated proBNP       (02 Sep 2023 18:56)      ?FOLLOWING PRESENT  [ ] Hx of PE/DVT, [ ] Hx COPD, [ ] Hx of Asthma, [ ] Hx of Hospitalization, [ ]  Hx of BiPAP/CPAP use, [ ] Hx of MAYA    Allergies    losartan (Angioedema)  Fish Products (Hives)  shellfish (Hives)    Intolerances        PAST MEDICAL & SURGICAL HISTORY:  Asthma      No significant past surgical history          FAMILY HISTORY:  No pertinent family history in first degree relatives        Social History: [  ] TOBACCO                  [  ] ETOH                                 [  ] IVDA/DRUGS    REVIEW OF SYSTEMS      General:	    Skin/Breast:  	  Ophthalmologic:  	  ENMT:	    Respiratory and Thorax:  	  Cardiovascular:	    Gastrointestinal:	    Genitourinary:	    Musculoskeletal:	    Neurological:	    Psychiatric:	    Hematology/Lymphatics:	    Endocrine:	    Allergic/Immunologic:	    MEDICATIONS  (STANDING):  albuterol/ipratropium for Nebulization 3 milliLiter(s) Nebulizer every 6 hours  budesonide 160 MICROgram(s)/formoterol 4.5 MICROgram(s) Inhaler 2 Puff(s) Inhalation two times a day  famotidine    Tablet 20 milliGRAM(s) Oral two times a day  hydrochlorothiazide 50 milliGRAM(s) Oral daily  loratadine 10 milliGRAM(s) Oral daily  methylPREDNISolone sodium succinate Injectable 40 milliGRAM(s) IV Push every 8 hours  montelukast 10 milliGRAM(s) Oral at bedtime    MEDICATIONS  (PRN):       Vital Signs Last 24 Hrs  T(C): 37.2 (03 Sep 2023 12:04), Max: 37.2 (03 Sep 2023 00:01)  T(F): 98.9 (03 Sep 2023 12:04), Max: 98.9 (03 Sep 2023 00:01)  HR: 86 (03 Sep 2023 12:04) (70 - 99)  BP: 159/78 (03 Sep 2023 12:04) (122/76 - 165/77)  BP(mean): --  RR: 20 (03 Sep 2023 12:04) (18 - 20)  SpO2: 96% (03 Sep 2023 12:04) (94% - 98%)    Parameters below as of 03 Sep 2023 12:04  Patient On (Oxygen Delivery Method): room air    Orthostatic VS          I&O's Summary    03 Sep 2023 07:01  -  03 Sep 2023 15:32  --------------------------------------------------------  IN: 480 mL / OUT: 400 mL / NET: 80 mL        Physical Exam:   GENERAL: NAD, well-groomed, well-developed  HEENT: CHING/   Atraumatic, Normocephalic  ENMT: No tonsillar erythema, exudates, or enlargement; Moist mucous membranes, Good dentition, No lesions  NECK: Supple, No JVD, Normal thyroid  CHEST/LUNG: Clear to auscultation bilaterally; No rales, rhonchi, wheezing, or rubs  CVS: Regular rate and rhythm; No murmurs, rubs, or gallops  GI: : Soft, Nontender, Nondistended; Bowel sounds present  NERVOUS SYSTEM:  Alert & Oriented X3, Good concentration; Motor Strength 5/5 B/L upper and lower extremities; DTRs 2+ intact and symmetric  EXTREMITIES:  2+ Peripheral Pulses, No clubbing, cyanosis, or edema  LYMPH: No lymphadenopathy noted  SKIN: No rashes or lesions  ENDOCRINOLOGY: No Thyromegaly  PSYCH: Appropriate    Labs:  9.2<57<4>>51<<7.415>>9.2<<3><<4><<5<<519>>, Venous<60<4>>38<<7.425>>Venous<<3><<4><<5<<389>>SARS-CoV-2: NotDetec (01 Sep 2023 11:07)                              14.1   7.69  )-----------( 246      ( 01 Sep 2023 10:36 )             44.7     09-02    140  |  98  |  16  ----------------------------<  207<H>  3.9   |  30  |  0.79  09-01    137  |  94<L>  |  8   ----------------------------<  184<H>  3.3<L>   |  30  |  0.68    Ca    9.6      02 Sep 2023 05:37    TPro  8.0  /  Alb  4.0  /  TBili  0.5  /  DBili  x   /  AST  19  /  ALT  16  /  AlkPhos  97  09-01    CAPILLARY BLOOD GLUCOSE            Urinalysis Basic - ( 02 Sep 2023 05:37 )    Color: x / Appearance: x / SG: x / pH: x  Gluc: 207 mg/dL / Ketone: x  / Bili: x / Urobili: x   Blood: x / Protein: x / Nitrite: x   Leuk Esterase: x / RBC: x / WBC x   Sq Epi: x / Non Sq Epi: x / Bacteria: x      D DImer      Studies  Chest X-RAY  CT SCAN Chest   CT Abdomen  Venous Dopplers: LE:   Others  rasd< from: Xray Chest 2 Views PA/Lat (09.01.23 @ 10:35) >  ACC: [id number] EXAM:  XR CHEST PA LAT 2V   ORDERED BY: BASHIR CALZADA     PROCEDURE DATE:  09/01/2023          INTERPRETATION:  CLINICAL INFORMATION: Wheezing.    TECHNIQUE: PA and lateral views of the chest.    COMPARISON: Chest x-ray 7/10/2023.Chest x-ray 7/8/2019.    FINDINGS:    Heart: Heart is mildly enlarged.  Pulmonary: Clear lungs. No pneumothorax or pleural effusion.  Bones: No acute bony pathology    IMPRESSION:  Clear lungs.    --- End of Report ---           ADRIANNA PICHARDO MD; Resident Radiologist  This document has been electronically signed.  DANGELO HOWARD MD; Attending Radiologist    < end of copied text >                     09-03-23 @ 15:32    Patient is a 68y old  Female who presents with a chief complaint of     HPI:   69 yo F with a PMH of asthma (never intubated nor hospitalized) p/w SOB, wheezing since 8/28. Pt states she was in a cab and the air freshener was very strong, immediately felt it trigger her asthma. Since then has had these sxs. Has been using her inhaler w/o relief. +Chest tightness, no pain. Denies nausea, vomiting, fever, chills, congestion, sore throat, cough, palpitations, leg pain/swelling, abd pain, headache, dizziness, weakness. No recent illness or sick contacts. Non smoker.ptn was admitted on 9/1 to CDU, placed on duonebs and IV Medrol, but remains hypoxic and bronchospastic the day after. Chest film without focal infiltrates. but sounds a little wet today and elevated proBNP   she say she has asthma and a have been on albuterol and singularity only:        (02 Sep 2023 18:56)      ?FOLLOWING PRESENT  [x ] Hx of PE/DVT, [x ] Hx COPD, [ ]y Hx of Asthma, [x Hx of Hospitalization, [x]  Hx of BiPAP/CPAP use, [x Hx of MAYA    Allergies    losartan (Angioedema)  Fish Products (Hives)  shellfish (Hives)    Intolerances        PAST MEDICAL & SURGICAL HISTORY:  Asthma      No significant past surgical history          FAMILY HISTORY:  No pertinent family history in first degree relatives        Social History: [  x] TOBACCO                  [x  ] ETOH                                 [ x IVDA/DRUGS    REVIEW OF SYSTEMS      General:	x    Skin/Breast:x  	  Ophthalmologic:x  	  ENMT:	x    Respiratory and Thorax:  so b,  wheezing  	  Cardiovascular:	x    Gastrointestinal:	x    Genitourinary:	x    Musculoskeletal:	x    Neurological:	x    Psychiatric:	x    Hematology/Lymphatics:	x    Endocrine:	x    Allergic/Immunologic:	x    MEDICATIONS  (STANDING):  albuterol/ipratropium for Nebulization 3 milliLiter(s) Nebulizer every 6 hours  budesonide 160 MICROgram(s)/formoterol 4.5 MICROgram(s) Inhaler 2 Puff(s) Inhalation two times a day  famotidine    Tablet 20 milliGRAM(s) Oral two times a day  hydrochlorothiazide 50 milliGRAM(s) Oral daily  loratadine 10 milliGRAM(s) Oral daily  methylPREDNISolone sodium succinate Injectable 40 milliGRAM(s) IV Push every 8 hours  montelukast 10 milliGRAM(s) Oral at bedtime    MEDICATIONS  (PRN):       Vital Signs Last 24 Hrs  T(C): 37.2 (03 Sep 2023 12:04), Max: 37.2 (03 Sep 2023 00:01)  T(F): 98.9 (03 Sep 2023 12:04), Max: 98.9 (03 Sep 2023 00:01)  HR: 86 (03 Sep 2023 12:04) (70 - 99)  BP: 159/78 (03 Sep 2023 12:04) (122/76 - 165/77)  BP(mean): --  RR: 20 (03 Sep 2023 12:04) (18 - 20)  SpO2: 96% (03 Sep 2023 12:04) (94% - 98%)    Parameters below as of 03 Sep 2023 12:04  Patient On (Oxygen Delivery Method): room air    Orthostatic VS          I&O's Summary    03 Sep 2023 07:01  -  03 Sep 2023 15:32  --------------------------------------------------------  IN: 480 mL / OUT: 400 mL / NET: 80 mL        Physical Exam:   GENERAL: obee  HEENT: CHING/   Atraumatic, Normocephalic  ENMT: No tonsillar erythema, exudates, or enlargement; Moist mucous membranes, Good dentition, No lesions  NECK: Supple, No JVD, Normal thyroid  CHEST/LUNG: Clear to auscultation bilaterally  CVS: Regular rate and rhythm; No murmurs, rubs, or gallops  GI: : Soft, Nontender, Nondistended; Bowel sounds present  NERVOUS SYSTEM:  Alert & Oriented X  EXTREMITIES:  2+ Peripheral Pulses, No clubbing, cyanosis, or edema  LYMPH: No lymphadenopathy noted  SKIN: No rashes or lesions  ENDOCRINOLOGY: No Thyromegaly  PSYCH: Appropriate    Labs:  9.2<57<4>>51<<7.415>>9.2<<3><<4><<5<<519>>, Venous<60<4>>38<<7.425>>Venous<<3><<4><<5<<389>>SARS-CoV-2: NotDetec (01 Sep 2023 11:07)                              14.1   7.69  )-----------( 246      ( 01 Sep 2023 10:36 )             44.7     09-02    140  |  98  |  16  ----------------------------<  207<H>  3.9   |  30  |  0.79  09-01    137  |  94<L>  |  8   ----------------------------<  184<H>  3.3<L>   |  30  |  0.68    Ca    9.6      02 Sep 2023 05:37    TPro  8.0  /  Alb  4.0  /  TBili  0.5  /  DBili  x   /  AST  19  /  ALT  16  /  AlkPhos  97  09-01    CAPILLARY BLOOD GLUCOSE            Urinalysis Basic - ( 02 Sep 2023 05:37 )    Color: x / Appearance: x / SG: x / pH: x  Gluc: 207 mg/dL / Ketone: x  / Bili: x / Urobili: x   Blood: x / Protein: x / Nitrite: x   Leuk Esterase: x / RBC: x / WBC x   Sq Epi: x / Non Sq Epi: x / Bacteria: x      D DImer      Studies  Chest X-RAY  CT SCAN Chest   CT Abdomen  Venous Dopplers: LE:   Others  rasd< from: Xray Chest 2 Views PA/Lat (09.01.23 @ 10:35) >  ACC: [id number] EXAM:  XR CHEST PA LAT 2V   ORDERED BY: BASHIR CALZADA     PROCEDURE DATE:  09/01/2023          INTERPRETATION:  CLINICAL INFORMATION: Wheezing.    TECHNIQUE: PA and lateral views of the chest.    COMPARISON: Chest x-ray 7/10/2023.Chest x-ray 7/8/2019.    FINDINGS:    Heart: Heart is mildly enlarged.  Pulmonary: Clear lungs. No pneumothorax or pleural effusion.  Bones: No acute bony pathology    IMPRESSION:  Clear lungs.    --- End of Report ---           ADRIANNA PICHARDO MD; Resident Radiologist  This document has been electronically signed.  DANGELO HOWARD MD; Attending Radiologist    < end of copied text >

## 2023-09-03 NOTE — DISCHARGE NOTE PROVIDER - NSDCMRMEDTOKEN_GEN_ALL_CORE_FT
famotidine 20 mg oral tablet: 1 tab(s) orally 2 times a day  hydroCHLOROthiazide 50 mg oral tablet: 1 tab(s) orally once a day (in the morning)  loratadine 10 mg oral tablet: 1 tab(s) orally once a day  montelukast 10 mg oral tablet: 1 tab(s) orally once a day  predniSONE 20 mg oral tablet: 2 tab(s) orally once a day  Symbicort 160 mcg-4.5 mcg/inh inhalation aerosol: 2 puff(s) inhaled 2 times a day

## 2023-09-03 NOTE — PROGRESS NOTE ADULT - SUBJECTIVE AND OBJECTIVE BOX
Patient is a 68y old  Female who presents with a chief complaint of Asthma (03 Sep 2023 15:31)      SUBJECTIVE / OVERNIGHT EVENTS: ptn feels much better, stable on RA. cleared by pulm for DC home w outptn F/U. would cont chronic asthma meds    MEDICATIONS  (STANDING):  albuterol/ipratropium for Nebulization 3 milliLiter(s) Nebulizer every 6 hours  budesonide 160 MICROgram(s)/formoterol 4.5 MICROgram(s) Inhaler 2 Puff(s) Inhalation two times a day  famotidine    Tablet 20 milliGRAM(s) Oral two times a day  hydrochlorothiazide 50 milliGRAM(s) Oral daily  loratadine 10 milliGRAM(s) Oral daily  montelukast 10 milliGRAM(s) Oral at bedtime    MEDICATIONS  (PRN):      Vital Signs Last 24 Hrs  T(F): 98.1 (09-03-23 @ 17:00), Max: 98.9 (09-03-23 @ 00:01)  HR: 90 (09-03-23 @ 17:00) (76 - 97)  BP: 159/72 (09-03-23 @ 17:00) (122/76 - 160/75)  RR: 19 (09-03-23 @ 17:00) (18 - 20)  SpO2: 95% (09-03-23 @ 17:00) (94% - 97%)  Telemetry:   CAPILLARY BLOOD GLUCOSE        I&O's Summary    03 Sep 2023 07:01  -  03 Sep 2023 23:23  --------------------------------------------------------  IN: 960 mL / OUT: 800 mL / NET: 160 mL        PHYSICAL EXAM:  GENERAL: NAD, well-developed  HEAD:  Atraumatic, Normocephalic  EYES: EOMI, PERRLA, conjunctiva and sclera clear  NECK: Supple, No JVD  CHEST/LUNG: Clear to auscultation bilaterally; No wheeze  HEART: Regular rate and rhythm; No murmurs, rubs, or gallops  ABDOMEN: Soft, Nontender, Nondistended; Bowel sounds present  EXTREMITIES:  2+ Peripheral Pulses, No clubbing, cyanosis, or edema  PSYCH: AAOx3  NEUROLOGY: non-focal  SKIN: No rashes or lesions    LABS:    09-02    140  |  98  |  16  ----------------------------<  207<H>  3.9   |  30  |  0.79    Ca    9.6      02 Sep 2023 05:37            Urinalysis Basic - ( 02 Sep 2023 05:37 )    Color: x / Appearance: x / SG: x / pH: x  Gluc: 207 mg/dL / Ketone: x  / Bili: x / Urobili: x   Blood: x / Protein: x / Nitrite: x   Leuk Esterase: x / RBC: x / WBC x   Sq Epi: x / Non Sq Epi: x / Bacteria: x        RADIOLOGY & ADDITIONAL TESTS:    Imaging Personally Reviewed:    Consultant(s) Notes Reviewed:      Care Discussed with Consultants/Other Providers:

## 2023-09-03 NOTE — PROGRESS NOTE ADULT - ASSESSMENT
67 yo F with a PMH of asthma (never intubated nor hospitalized) p/w SOB, wheezing since 8/28. Pt states she was in a cab and the air freshener was very strong, immediately felt it trigger her asthma. Since then has had these sxs. Has been using her inhaler w/o relief. +Chest tightness, no pain. Denies nausea, vomiting, fever, chills, congestion, sore throat, cough, palpitations, leg pain/swelling, abd pain, headache, dizziness, weakness. No recent illness or sick contacts. Non smoker.ptn was admitted on 9/1 to CDU, placed on duonebs and IV Medrol, but remains hypoxic and bronchospastic the day after.Chest film without focal infiltrates.. but sounds a little wet today and elevated proBNP    PLAN:  Asthmaic exacerbation 2/2 allergic reaction to air freshener   cont claritin, pepcid and singulair  cont  Symbicort and duonebs  switch to po prednisone  ptn feels much better, stable on RA. cleared by pulm for DC home w outptn F/U. would cont chronic asthma meds    dvt ppx w scd

## 2023-09-03 NOTE — DISCHARGE NOTE PROVIDER - HOSPITAL COURSE
69 yo F with a PMH of asthma (never intubated nor hospitalized) p/w SOB, wheezing since 8/28. Pt states she was in a cab and the air freshener was very strong, immediately felt it trigger her asthma. Since then has had these systems. Has been using her inhaler w/o relief. +Chest tightness, no pain. Denies nausea, vomiting, fever, chills, congestion, sore throat, cough, palpitations, leg pain/swelling, abd pain, headache, dizziness, weakness. No recent illness or sick contacts. Non smoker. Patient was admitted on 9/1 to CDU, placed on duonebs and IV Medrol. Chest film without focal infiltrates.   Patient now clinically improved and on room air, changed to po prednisone for 5 more days. Medically cleared for discharge on prednisone, Symbicort, Singulair, and will need to follow up with pulmonologist outpatient for pulmonary function testing.

## 2023-09-03 NOTE — DISCHARGE NOTE NURSING/CASE MANAGEMENT/SOCIAL WORK - NSDCPEFALRISK_GEN_ALL_CORE
For information on Fall & Injury Prevention, visit: [email or web address] OR  [email or web address] OR  https://www.cdc.gov/steadi/patient.html

## 2023-09-03 NOTE — DISCHARGE NOTE NURSING/CASE MANAGEMENT/SOCIAL WORK - PATIENT PORTAL LINK FT
You can access the FollowMyHealth Patient Portal offered by [facility] by registering at the following website: [email or web address]. By joining [facility]’s FollowMyHealth portal, you will also be able to view your health information using other applications (apps) compatible with our system.

## 2023-09-03 NOTE — CONSULT NOTE ADULT - ASSESSMENT
67 yo F with a PMH of asthma (never intubated nor hospitalized) p/w SOB, wheezing since 8/28. Pt states she was in a cab and the air freshener was very strong, immediately felt it trigger her asthma. Since then has had these sxs. Has been using her inhaler w/o relief. +Chest tightness, no pain. Denies nausea, vomiting, fever, chills, congestion, sore throat, cough, palpitations, leg pain/swelling, abd pain, headache, dizziness, weakness. No recent illness or sick contacts. Non smoker.ptn was admitted on 9/1 to CDU, placed on duonebs and IV Medrol, but remains hypoxic and bronchospastic the day after. Chest film without focal infiltrates. but sounds a little wet today and elevated proBNP  67 yo F with a PMH of asthma (never intubated nor hospitalized) p/w SOB, wheezing since 8/28. Pt states she was in a cab and the air freshener was very strong, immediately felt it trigger her asthma. Since then has had these sxs. Has been using her inhaler w/o relief. +Chest tightness, no pain. Denies nausea, vomiting, fever, chills, congestion, sore throat, cough, palpitations, leg pain/swelling, abd pain, headache, dizziness, weakness. No recent illness or sick contacts. Non smoker.ptn was admitted on 9/1 to CDU, placed on duonebs and IV Medrol, but remains hypoxic and bronchospastic the day after. Chest film without focal infiltrates. but sounds a little wet today and elevated proBNP        Asthma exacerbation   -doing pretty well  now': ]  -change to po prednisone   -needs to be dced on Symbicort and singilar:    -needs outpt pft:   -she likely has OHS too ; chr co2 retention:  -likely has daniela too :  if she is dced she can follow with nereida my office at [phone]:    kimberley acp

## 2023-10-13 ENCOUNTER — EMERGENCY (EMERGENCY)
Facility: HOSPITAL | Age: 68
LOS: 1 days | Discharge: HOME | End: 2023-10-13
Attending: EMERGENCY MEDICINE
Payer: MEDICARE

## 2023-10-13 VITALS
HEART RATE: 96 BPM | RESPIRATION RATE: 18 BRPM | OXYGEN SATURATION: 98 % | SYSTOLIC BLOOD PRESSURE: 143 MMHG | WEIGHT: 229.94 LBS | DIASTOLIC BLOOD PRESSURE: 81 MMHG | TEMPERATURE: 98 F | HEIGHT: 65 IN

## 2023-10-13 PROBLEM — J45.909 UNSPECIFIED ASTHMA, UNCOMPLICATED: Chronic | Status: ACTIVE | Noted: 2023-09-01

## 2023-10-13 PROCEDURE — 73562 X-RAY EXAM OF KNEE 3: CPT | Mod: 26,50

## 2023-10-13 PROCEDURE — 73562 X-RAY EXAM OF KNEE 3: CPT

## 2023-10-13 PROCEDURE — 99284 EMERGENCY DEPT VISIT MOD MDM: CPT

## 2023-10-13 PROCEDURE — 99283 EMERGENCY DEPT VISIT LOW MDM: CPT | Mod: 25

## 2023-10-13 RX ORDER — IBUPROFEN 200 MG
600 TABLET ORAL ONCE
Refills: 0 | Status: COMPLETED | OUTPATIENT
Start: 2023-10-13 | End: 2023-10-13

## 2023-10-13 RX ORDER — ACETAMINOPHEN 500 MG
975 TABLET ORAL ONCE
Refills: 0 | Status: COMPLETED | OUTPATIENT
Start: 2023-10-13 | End: 2023-10-13

## 2023-10-13 RX ORDER — MELOXICAM 15 MG/1
1 TABLET ORAL
Qty: 14 | Refills: 0
Start: 2023-10-13 | End: 2023-10-26

## 2023-10-13 RX ADMIN — Medication 975 MILLIGRAM(S): at 10:26

## 2023-10-13 RX ADMIN — Medication 600 MILLIGRAM(S): at 10:26

## 2023-10-13 NOTE — ED PROVIDER NOTE - PHYSICAL EXAMINATION
Awake alert talking no acute distress.  The knees appear normal and there is no redness or warmth or swelling.  On exam the patella is bilaterally normal.  There is no bony tenderness.  And able to fully extend and flex essentially 90 degrees.  There is crepitations bilaterally.  2+ dorsalis pedis pulse.  There is no bony tenderness to the shin or to the thighs.

## 2023-10-13 NOTE — ED ADULT NURSE NOTE - OBJECTIVE STATEMENT
68 yr old female via wheelchair from triage c/o josue knee pain x 1 wk, denies trauma/injury, hx OA x 7 yrs ago, +admits to difficulty wt bearing due to pain, at baseline : independent with ADLs

## 2023-10-13 NOTE — ED PROVIDER NOTE - NSFOLLOWUPINSTRUCTIONS_ED_ALL_ED_FT
IMPORTANT INSTRUCTIONS FROM Dr. FAN:    Please follow up with your personal medical doctor in 24-48 hours. See orthopedics within 1 wk.  Bring results from today to your visit.    If you were advised to take any medications - be sure to review the package insert.    If your symptoms change, get worse or if you have any new symptoms, come to the ER right away.  If you have any questions, call the ER at the phone number on this page.

## 2023-10-13 NOTE — ED PROVIDER NOTE - OBJECTIVE STATEMENT
This is a 68-year-old female who is coming in with slow onset bilateral knee pain.  There is no injury or trauma.  She has a history of arthritis in those knees.  She has been taking Motrin with some relief and her doctor prescribed a higher dose.  No fevers or chills.  No trauma or injury.  It has been causing her trouble to get about the house.  When she was first diagnosed with knee arthritis about a decade ago she declined any further intervention such as knee injections.

## 2023-10-13 NOTE — ED PROVIDER NOTE - CARE PROVIDER_API CALL
James Armstrong  Orthopaedic Surgery  [address] [place] [address], [address]  [place] [address]  Phone: [phone]  Fax: [phone]  Follow Up Time: Urgent

## 2023-10-13 NOTE — ED PROVIDER NOTE - CLINICAL SUMMARY MEDICAL DECISION MAKING FREE TEXT BOX
This is likely to be joint osteo arthritis.  There is no signs of inflammatory conditions and at this time as such we will not do joint aspiration.  We will x-ray the knees and I advised the patient regarding home care including longer acting nonsteroidals as the patient has difficulty getting about and severity of the symptoms.  Will have her see orthopedic surgery as well as the patient will likely require intervention further.  Alternate diagnoses were considered and thought to be very unlikely.

## 2023-10-13 NOTE — ED ADULT NURSE NOTE - NSFALLRISKINTERV_ED_ALL_ED

## 2023-10-13 NOTE — ED PROVIDER NOTE - PATIENT PORTAL LINK FT
You can access the FollowMyHealth Patient Portal offered by [facility] by registering at the following website: [email or web address]. By joining [facility]’s FollowMyHealth portal, you will also be able to view your health information using other applications (apps) compatible with our system.

## 2023-10-24 NOTE — ED POST DISCHARGE NOTE - ADDITIONAL DOCUMENTATION
10/24/23: pt called, reports she ran out of Mobic and needs more medication for pain management, was prescribed mobic by the ED and should have 2 more days but reports she took the last pill yesterday. Pt reports she had an appt last week with ortho and has been calling regarding an Rx but has not yet received a call back. I called myself and talked to Carrie ( of Dr. Armstrong) and explained situation and that pt needs a call back from ortho. -Zain Ruvalcaba PA-C 10/24/23: pt called, reports she ran out of Mobic and needs more medication for pain management, was prescribed mobic by the ED and should have 2 more days but reports she took the last pill yesterday. Pt reports she had an appt last week with ortho and has been calling regarding an Rx but has not yet received a call back. I called myself and was transferred to Carrie ( of Dr. Armstrong) no answer, lvm to call the pt back regarding Rx. -Zain Ruvalcaba PA-C

## 2023-11-15 NOTE — ED CDU PROVIDER SUBSEQUENT DAY NOTE - NSICDXFAMHXNEG_GEN_ED
CMS SUB-3  Measure  Has the patient ever used any alcohol or any other substance such as cocaine, marijuana, ecstasy, pain medications, heroin, methamphetamines, etc. at any point in their lifetime? Yes    If yes,...  1. Is the pt interested in being prescribed an FDA-approved medication for alcohol or opiate addiction at discharge? No  2. Is the pt interested in an aftercare appointment for addictions treatment (i.e., AODA PHP/IOP, RTC, MH PHP/IOP, OP therapy, OP psychiatry, OP PCP, OP NP/PA)? No    Therapist will notify patient's inpatient attending physician and .   Shelley Cook, [facility]     asthma

## 2024-07-02 ENCOUNTER — NON-APPOINTMENT (OUTPATIENT)
Age: 69
End: 2024-07-02

## 2024-07-03 ENCOUNTER — APPOINTMENT (OUTPATIENT)
Dept: GASTROENTEROLOGY | Facility: CLINIC | Age: 69
End: 2024-07-03
Payer: MEDICARE

## 2024-07-03 VITALS
DIASTOLIC BLOOD PRESSURE: 75 MMHG | SYSTOLIC BLOOD PRESSURE: 128 MMHG | OXYGEN SATURATION: 99 % | HEART RATE: 65 BPM | BODY MASS INDEX: 39.99 KG/M2 | HEIGHT: 65 IN | WEIGHT: 240 LBS

## 2024-07-03 PROBLEM — Z00.00 ENCOUNTER FOR PREVENTIVE HEALTH EXAMINATION: Status: ACTIVE | Noted: 2024-07-03

## 2024-07-03 PROCEDURE — 99202 OFFICE O/P NEW SF 15 MIN: CPT

## 2025-06-28 RX ORDER — AZITHROMYCIN 250 MG
2 CAPSULE ORAL
Refills: 0 | DISCHARGE
Start: 2025-06-28 | End: 2025-07-02

## 2025-07-02 ENCOUNTER — INPATIENT (INPATIENT)
Facility: HOSPITAL | Age: 70
LOS: 1 days | Discharge: HOME | DRG: 203 | End: 2025-07-04
Attending: INTERNAL MEDICINE | Admitting: INTERNAL MEDICINE
Payer: COMMERCIAL

## 2025-07-02 VITALS
HEIGHT: 65 IN | DIASTOLIC BLOOD PRESSURE: 78 MMHG | WEIGHT: 248.9 LBS | OXYGEN SATURATION: 89 % | TEMPERATURE: 98 F | HEART RATE: 97 BPM | RESPIRATION RATE: 24 BRPM | SYSTOLIC BLOOD PRESSURE: 144 MMHG

## 2025-07-02 DIAGNOSIS — J45.901 UNSPECIFIED ASTHMA WITH (ACUTE) EXACERBATION: ICD-10-CM

## 2025-07-02 DIAGNOSIS — I10 ESSENTIAL (PRIMARY) HYPERTENSION: ICD-10-CM

## 2025-07-02 DIAGNOSIS — E87.6 HYPOKALEMIA: ICD-10-CM

## 2025-07-02 LAB
ADD ON TEST-SPECIMEN IN LAB: SIGNIFICANT CHANGE UP
ADD ON TEST-SPECIMEN IN LAB: SIGNIFICANT CHANGE UP
ALBUMIN SERPL ELPH-MCNC: 4.2 G/DL — SIGNIFICANT CHANGE UP (ref 3.3–5)
ALP SERPL-CCNC: 85 U/L — SIGNIFICANT CHANGE UP (ref 40–120)
ALT FLD-CCNC: 24 U/L — SIGNIFICANT CHANGE UP (ref 10–45)
ANION GAP SERPL CALC-SCNC: 19 MMOL/L — HIGH (ref 5–17)
AST SERPL-CCNC: 25 U/L — SIGNIFICANT CHANGE UP (ref 10–40)
BASOPHILS # BLD AUTO: 0.1 K/UL — SIGNIFICANT CHANGE UP (ref 0–0.2)
BASOPHILS NFR BLD AUTO: 1.8 % — SIGNIFICANT CHANGE UP (ref 0–2)
BILIRUB SERPL-MCNC: 0.4 MG/DL — SIGNIFICANT CHANGE UP (ref 0.2–1.2)
BUN SERPL-MCNC: 9 MG/DL — SIGNIFICANT CHANGE UP (ref 7–23)
CALCIUM SERPL-MCNC: 9.3 MG/DL — SIGNIFICANT CHANGE UP (ref 8.4–10.5)
CHLORIDE SERPL-SCNC: 98 MMOL/L — SIGNIFICANT CHANGE UP (ref 96–108)
CO2 SERPL-SCNC: 23 MMOL/L — SIGNIFICANT CHANGE UP (ref 22–31)
CREAT SERPL-MCNC: 0.67 MG/DL — SIGNIFICANT CHANGE UP (ref 0.5–1.3)
EGFR: 95 ML/MIN/1.73M2 — SIGNIFICANT CHANGE UP
EGFR: 95 ML/MIN/1.73M2 — SIGNIFICANT CHANGE UP
EOSINOPHIL # BLD AUTO: 1.06 K/UL — HIGH (ref 0–0.5)
EOSINOPHIL NFR BLD AUTO: 18.7 % — HIGH (ref 0–6)
FLUAV AG NPH QL: SIGNIFICANT CHANGE UP
FLUBV AG NPH QL: SIGNIFICANT CHANGE UP
GAS PNL BLDV: SIGNIFICANT CHANGE UP
GLUCOSE SERPL-MCNC: 156 MG/DL — HIGH (ref 70–99)
HCT VFR BLD CALC: 48 % — HIGH (ref 34.5–45)
HGB BLD-MCNC: 15.1 G/DL — SIGNIFICANT CHANGE UP (ref 11.5–15.5)
IMM GRANULOCYTES # BLD AUTO: 0.02 K/UL — SIGNIFICANT CHANGE UP (ref 0–0.07)
IMM GRANULOCYTES NFR BLD AUTO: 0.4 % — SIGNIFICANT CHANGE UP (ref 0–0.9)
LYMPHOCYTES # BLD AUTO: 1.49 K/UL — SIGNIFICANT CHANGE UP (ref 1–3.3)
LYMPHOCYTES NFR BLD AUTO: 26.3 % — SIGNIFICANT CHANGE UP (ref 13–44)
MCHC RBC-ENTMCNC: 28.3 PG — SIGNIFICANT CHANGE UP (ref 27–34)
MCHC RBC-ENTMCNC: 31.5 G/DL — LOW (ref 32–36)
MCV RBC AUTO: 89.9 FL — SIGNIFICANT CHANGE UP (ref 80–100)
MONOCYTES # BLD AUTO: 0.31 K/UL — SIGNIFICANT CHANGE UP (ref 0–0.9)
MONOCYTES NFR BLD AUTO: 5.5 % — SIGNIFICANT CHANGE UP (ref 2–14)
NEUTROPHILS # BLD AUTO: 2.69 K/UL — SIGNIFICANT CHANGE UP (ref 1.8–7.4)
NEUTROPHILS NFR BLD AUTO: 47.3 % — SIGNIFICANT CHANGE UP (ref 43–77)
NRBC # BLD AUTO: 0 K/UL — SIGNIFICANT CHANGE UP (ref 0–0)
NRBC # FLD: 0 K/UL — SIGNIFICANT CHANGE UP (ref 0–0)
NRBC BLD AUTO-RTO: 0 /100 WBCS — SIGNIFICANT CHANGE UP (ref 0–0)
PLATELET # BLD AUTO: 193 K/UL — SIGNIFICANT CHANGE UP (ref 150–400)
PMV BLD: 11.8 FL — SIGNIFICANT CHANGE UP (ref 7–13)
POTASSIUM SERPL-MCNC: 3.2 MMOL/L — LOW (ref 3.5–5.3)
POTASSIUM SERPL-SCNC: 3.2 MMOL/L — LOW (ref 3.5–5.3)
PROT SERPL-MCNC: 7.9 G/DL — SIGNIFICANT CHANGE UP (ref 6–8.3)
RBC # BLD: 5.34 M/UL — HIGH (ref 3.8–5.2)
RBC # FLD: 13.8 % — SIGNIFICANT CHANGE UP (ref 10.3–14.5)
RSV RNA NPH QL NAA+NON-PROBE: SIGNIFICANT CHANGE UP
SARS-COV-2 RNA SPEC QL NAA+PROBE: SIGNIFICANT CHANGE UP
SODIUM SERPL-SCNC: 140 MMOL/L — SIGNIFICANT CHANGE UP (ref 135–145)
SOURCE RESPIRATORY: SIGNIFICANT CHANGE UP
WBC # BLD: 5.67 K/UL — SIGNIFICANT CHANGE UP (ref 3.8–10.5)
WBC # FLD AUTO: 5.67 K/UL — SIGNIFICANT CHANGE UP (ref 3.8–10.5)

## 2025-07-02 PROCEDURE — 85014 HEMATOCRIT: CPT

## 2025-07-02 PROCEDURE — 84295 ASSAY OF SERUM SODIUM: CPT

## 2025-07-02 PROCEDURE — 84132 ASSAY OF SERUM POTASSIUM: CPT

## 2025-07-02 PROCEDURE — 93010 ELECTROCARDIOGRAM REPORT: CPT | Mod: 77

## 2025-07-02 PROCEDURE — 71250 CT THORAX DX C-: CPT | Mod: 26

## 2025-07-02 PROCEDURE — 83735 ASSAY OF MAGNESIUM: CPT

## 2025-07-02 PROCEDURE — 93010 ELECTROCARDIOGRAM REPORT: CPT

## 2025-07-02 PROCEDURE — 82435 ASSAY OF BLOOD CHLORIDE: CPT

## 2025-07-02 PROCEDURE — 99285 EMERGENCY DEPT VISIT HI MDM: CPT

## 2025-07-02 PROCEDURE — 85018 HEMOGLOBIN: CPT

## 2025-07-02 PROCEDURE — 82947 ASSAY GLUCOSE BLOOD QUANT: CPT

## 2025-07-02 PROCEDURE — 84145 PROCALCITONIN (PCT): CPT

## 2025-07-02 PROCEDURE — 71046 X-RAY EXAM CHEST 2 VIEWS: CPT

## 2025-07-02 PROCEDURE — 80053 COMPREHEN METABOLIC PANEL: CPT

## 2025-07-02 PROCEDURE — 0241U: CPT

## 2025-07-02 PROCEDURE — 71250 CT THORAX DX C-: CPT

## 2025-07-02 PROCEDURE — 83605 ASSAY OF LACTIC ACID: CPT

## 2025-07-02 PROCEDURE — 94640 AIRWAY INHALATION TREATMENT: CPT

## 2025-07-02 PROCEDURE — 85025 COMPLETE CBC W/AUTO DIFF WBC: CPT

## 2025-07-02 PROCEDURE — 82330 ASSAY OF CALCIUM: CPT

## 2025-07-02 PROCEDURE — 71046 X-RAY EXAM CHEST 2 VIEWS: CPT | Mod: 26

## 2025-07-02 PROCEDURE — 82803 BLOOD GASES ANY COMBINATION: CPT

## 2025-07-02 RX ORDER — AZITHROMYCIN 250 MG
500 CAPSULE ORAL EVERY 24 HOURS
Refills: 0 | Status: DISCONTINUED | OUTPATIENT
Start: 2025-07-02 | End: 2025-07-04

## 2025-07-02 RX ORDER — FLUTICASONE PROPIONATE 50 UG/1
1 SPRAY, METERED NASAL
Refills: 0 | Status: DISCONTINUED | OUTPATIENT
Start: 2025-07-02 | End: 2025-07-04

## 2025-07-02 RX ORDER — ATORVASTATIN CALCIUM 80 MG/1
10 TABLET, FILM COATED ORAL AT BEDTIME
Refills: 0 | Status: DISCONTINUED | OUTPATIENT
Start: 2025-07-02 | End: 2025-07-04

## 2025-07-02 RX ORDER — HYDROCHLOROTHIAZIDE 50 MG/1
1 TABLET ORAL
Refills: 0 | DISCHARGE

## 2025-07-02 RX ORDER — IPRATROPIUM BROMIDE AND ALBUTEROL SULFATE .5; 2.5 MG/3ML; MG/3ML
3 SOLUTION RESPIRATORY (INHALATION) ONCE
Refills: 0 | Status: COMPLETED | OUTPATIENT
Start: 2025-07-02 | End: 2025-07-02

## 2025-07-02 RX ORDER — METHYLPREDNISOLONE ACETATE 80 MG/ML
125 INJECTION, SUSPENSION INTRA-ARTICULAR; INTRALESIONAL; INTRAMUSCULAR; SOFT TISSUE ONCE
Refills: 0 | Status: COMPLETED | OUTPATIENT
Start: 2025-07-02 | End: 2025-07-02

## 2025-07-02 RX ORDER — MONTELUKAST SODIUM 10 MG/1
1 TABLET ORAL
Refills: 0 | DISCHARGE

## 2025-07-02 RX ORDER — MONTELUKAST SODIUM 10 MG/1
10 TABLET ORAL AT BEDTIME
Refills: 0 | Status: DISCONTINUED | OUTPATIENT
Start: 2025-07-02 | End: 2025-07-04

## 2025-07-02 RX ORDER — METHYLPREDNISOLONE ACETATE 80 MG/ML
20 INJECTION, SUSPENSION INTRA-ARTICULAR; INTRALESIONAL; INTRAMUSCULAR; SOFT TISSUE EVERY 8 HOURS
Refills: 0 | Status: DISCONTINUED | OUTPATIENT
Start: 2025-07-02 | End: 2025-07-04

## 2025-07-02 RX ORDER — IPRATROPIUM BROMIDE AND ALBUTEROL SULFATE .5; 2.5 MG/3ML; MG/3ML
3 SOLUTION RESPIRATORY (INHALATION) EVERY 6 HOURS
Refills: 0 | Status: DISCONTINUED | OUTPATIENT
Start: 2025-07-02 | End: 2025-07-04

## 2025-07-02 RX ORDER — AMLODIPINE BESYLATE 10 MG/1
10 TABLET ORAL DAILY
Refills: 0 | Status: DISCONTINUED | OUTPATIENT
Start: 2025-07-02 | End: 2025-07-04

## 2025-07-02 RX ORDER — AZITHROMYCIN 250 MG
500 CAPSULE ORAL ONCE
Refills: 0 | Status: COMPLETED | OUTPATIENT
Start: 2025-07-02 | End: 2025-07-02

## 2025-07-02 RX ORDER — ENOXAPARIN SODIUM 100 MG/ML
40 INJECTION SUBCUTANEOUS EVERY 24 HOURS
Refills: 0 | Status: DISCONTINUED | OUTPATIENT
Start: 2025-07-02 | End: 2025-07-04

## 2025-07-02 RX ORDER — AMLODIPINE BESYLATE 10 MG/1
1 TABLET ORAL
Refills: 0 | DISCHARGE

## 2025-07-02 RX ORDER — CEFTRIAXONE 500 MG/1
1000 INJECTION, POWDER, FOR SOLUTION INTRAMUSCULAR; INTRAVENOUS EVERY 24 HOURS
Refills: 0 | Status: DISCONTINUED | OUTPATIENT
Start: 2025-07-02 | End: 2025-07-04

## 2025-07-02 RX ORDER — FLUTICASONE PROPIONATE 50 UG/1
1 SPRAY, METERED NASAL
Refills: 0 | DISCHARGE

## 2025-07-02 RX ORDER — CEFTRIAXONE 500 MG/1
1000 INJECTION, POWDER, FOR SOLUTION INTRAMUSCULAR; INTRAVENOUS EVERY 24 HOURS
Refills: 0 | Status: COMPLETED | OUTPATIENT
Start: 2025-07-02 | End: 2025-07-02

## 2025-07-02 RX ORDER — TIOTROPIUM BROMIDE INHALATION SPRAY 3.12 UG/1
2 SPRAY, METERED RESPIRATORY (INHALATION) DAILY
Refills: 0 | Status: DISCONTINUED | OUTPATIENT
Start: 2025-07-02 | End: 2025-07-04

## 2025-07-02 RX ADMIN — ENOXAPARIN SODIUM 40 MILLIGRAM(S): 100 INJECTION SUBCUTANEOUS at 22:46

## 2025-07-02 RX ADMIN — METHYLPREDNISOLONE ACETATE 125 MILLIGRAM(S): 80 INJECTION, SUSPENSION INTRA-ARTICULAR; INTRALESIONAL; INTRAMUSCULAR; SOFT TISSUE at 09:03

## 2025-07-02 RX ADMIN — IPRATROPIUM BROMIDE AND ALBUTEROL SULFATE 3 MILLILITER(S): .5; 2.5 SOLUTION RESPIRATORY (INHALATION) at 09:27

## 2025-07-02 RX ADMIN — Medication 250 MILLIGRAM(S): at 22:48

## 2025-07-02 RX ADMIN — Medication 1000 MILLILITER(S): at 10:17

## 2025-07-02 RX ADMIN — ATORVASTATIN CALCIUM 10 MILLIGRAM(S): 80 TABLET, FILM COATED ORAL at 22:46

## 2025-07-02 RX ADMIN — Medication 250 MILLIGRAM(S): at 17:50

## 2025-07-02 RX ADMIN — Medication 1 DOSE(S): at 18:06

## 2025-07-02 RX ADMIN — METHYLPREDNISOLONE ACETATE 20 MILLIGRAM(S): 80 INJECTION, SUSPENSION INTRA-ARTICULAR; INTRALESIONAL; INTRAMUSCULAR; SOFT TISSUE at 22:47

## 2025-07-02 RX ADMIN — Medication 40 MILLIEQUIVALENT(S): at 13:14

## 2025-07-02 RX ADMIN — IPRATROPIUM BROMIDE AND ALBUTEROL SULFATE 3 MILLILITER(S): .5; 2.5 SOLUTION RESPIRATORY (INHALATION) at 09:05

## 2025-07-02 RX ADMIN — IPRATROPIUM BROMIDE AND ALBUTEROL SULFATE 3 MILLILITER(S): .5; 2.5 SOLUTION RESPIRATORY (INHALATION) at 09:23

## 2025-07-02 RX ADMIN — IPRATROPIUM BROMIDE AND ALBUTEROL SULFATE 3 MILLILITER(S): .5; 2.5 SOLUTION RESPIRATORY (INHALATION) at 18:07

## 2025-07-02 RX ADMIN — CEFTRIAXONE 100 MILLIGRAM(S): 500 INJECTION, POWDER, FOR SOLUTION INTRAMUSCULAR; INTRAVENOUS at 16:24

## 2025-07-02 RX ADMIN — CEFTRIAXONE 100 MILLIGRAM(S): 500 INJECTION, POWDER, FOR SOLUTION INTRAMUSCULAR; INTRAVENOUS at 22:48

## 2025-07-02 RX ADMIN — IPRATROPIUM BROMIDE AND ALBUTEROL SULFATE 3 MILLILITER(S): .5; 2.5 SOLUTION RESPIRATORY (INHALATION) at 22:49

## 2025-07-02 NOTE — CONSULT NOTE ADULT - ASSESSMENT
69 y/o F with PMH of HTN, asthma (prior hospitalization, never intubated). Presents with nasal congestion, cough, and SOB for 6 days. Reports cough with yellow sputum production. Pt f/u with PCP during this time, started on zpack and taking for past 5 days. Pt also takes an inhaled steroid daily. Was referred for CXR but has not gone yet. Reports worsening SOB so came to ED. +Wheezing and hypoxia. CXR with possible PNA.

## 2025-07-02 NOTE — H&P ADULT - ASSESSMENT
69yo F with PMH of HTN, asthma, presenting with nasal congestion, cough, and SOB for 6 days. Reports cough with yellow sputum production. Pt f/u with PCP during this time, started on zpack and taking for past 5 days. Pt also takes an inhaled steroid daily. No current PO steroids. Was referred for CXR but has not gone yet. Reports worsening SOB so came to ED. Reports she has been hospitalized before for asthma, never intubated. Denies fever/chills, CP, abdominal pain, n/v, sick contacts, recent travel.

## 2025-07-02 NOTE — H&P ADULT - TIME BILLING
Admission H&P including bedside history , examination , reviewing test results and treatment plan. Pulmonary  consulted .D/W Patient and ACP.

## 2025-07-02 NOTE — ED ADULT NURSE REASSESSMENT NOTE - NS ED NURSE REASSESS COMMENT FT1
Report taken from Raheem TREJO at 1100. Pt introduced to oncoming RN and updated on plan of care.  pt is A&OX4, Ambulatory, VSS with 2L O2 via NC. Call bell in reach, pt educated on use. Bed locked and in lowest position. Denies any needs or complaints at this time.

## 2025-07-02 NOTE — ED PROVIDER NOTE - CLINICAL SUMMARY MEDICAL DECISION MAKING FREE TEXT BOX
Attending Farshad: 71 y/o F w/ PMH of Asthma, HTN presenting w/ shortness of breath. Reports on Friday developed cough, congestion. Saw PCP on Saturday and was given a z-pack which she has been taking. Symptoms have not improved despite the z-pack and nebulizers at home. Developed dark mucous as well so was concerned for PNA. Came to the ED for further evaluation. Denies prior need for intubation due to asthma. Denies fevers, chills, headache, dizziness, blurred vision, chest pain, abdominal pain, n/v/d/c, urinary symptoms, MSK pain, rash. Pt overall no acute distress. Head NCAT. Oxygen 95% on 2L NC, lungs w/ diffuse exp wheeze, no resp distress. HR regular. Abd nondistended/soft/nontender. No CVA tenderness. Non focal neuro exam. Calm and cooperative. Pt here w/ cough, congestion, shortness of breath. Suspect asthma exacerbation, likely in setting of URI. Will eval for PNA. Do not suspect ACS or CHF. Will treat symptomatically. Plan for labs, meds, imaging, EKG. Will reassess the need for additional interventions as clinically warranted. Refer to any progress notes for updates on clinical course and as a continuation of this MDM. Attending Farshad: 71 y/o F w/ PMH of Asthma, HTN presenting w/ shortness of breath. Reports on Friday developed cough, congestion. Saw PCP on Saturday and was given a z-pack which she has been taking. Symptoms have not improved despite the z-pack and nebulizers at home. Developed dark mucous as well so was concerned for PNA. Came to the ED for further evaluation. Denies prior need for intubation due to asthma. Denies fevers, chills, headache, dizziness, blurred vision, chest pain, abdominal pain, n/v/d/c, urinary symptoms, MSK pain, rash. Pt overall no acute distress. Head NCAT. Oxygen 95% on 2L NC, lungs w/ diffuse exp wheeze, no resp distress. HR regular. Abd nondistended/soft/nontender. No CVA tenderness. Non focal neuro exam. Calm and cooperative. Pt here w/ cough, congestion, shortness of breath. Suspect asthma exacerbation, likely in setting of URI. Will eval for PNA. Do not suspect ACS or CHF. Will treat symptomatically. Plan for labs, meds, imaging, EKG. Will reassess the need for additional interventions as clinically warranted. Refer to any progress notes for updates on clinical course and as a continuation of this MDM.    I, Dr. Navi Yen, independently interpreted the EKG which showed NSR at a rate of 82, QTc of 170.

## 2025-07-02 NOTE — ED PROVIDER NOTE - ATTENDING APP SHARED VISIT CONTRIBUTION OF CARE
Attending Farshad: I performed a face to face evaluation of the patient and obtained a history as well as performed a physical exam. I have discussed their management with the [employer]. I have reviewed the [employer] note and agree with the documented findings and plan of care, except as noted. This was a shared visit with an [employer]. I reviewed and verified the documentation and independently performed my own history/exam/medical decision making. My medical decision making and observations are found above. Please refer to any progress notes for updates on clinical course. My notes supersedes the above [employer] note in case of discrepancy

## 2025-07-02 NOTE — H&P ADULT - HISTORY OF PRESENT ILLNESS
71yo F with PMH of HTN, asthma, presenting with nasal congestion, cough, and SOB for 6 days. Reports cough with yellow sputum production. Pt f/u with PCP during this time, started on zpack and taking for past 5 days. Pt also takes an inhaled steroid daily. No current PO steroids. Was referred for CXR but has not gone yet. Reports worsening SOB so came to ED. Reports she has been hospitalized before for asthma, never intubated. Denies fever/chills, CP, abdominal pain, n/v, sick contacts, recent travel.

## 2025-07-02 NOTE — CONSULT NOTE ADULT - PROBLEM SELECTOR RECOMMENDATION 9
2nd to asthma exacerbation, ?PNA  -Start solumedrol 20mg IVP q8h  -Start Duoneb q6h  -Start Advair 250/50 1 puff BID  -Start Spiriva 2 puffs qd  -Keep sats >90% with o2 PRN

## 2025-07-02 NOTE — ED ADULT NURSE NOTE - NS ED NURSE REPORT GIVEN DT
REVIEW OF SYSTEMS:  CONSTITUTIONAL: No fever, weight loss, or fatigue  EYES: No eye pain, visual disturbances, or discharge  ENMT:  No difficulty hearing, tinnitus, vertigo; No sinus or throat pain  NECK: No pain or stiffness  RESPIRATORY: No cough, wheezing, chills or hemoptysis; No shortness of breath  CARDIOVASCULAR: +left sided chest pain, LUE numbness/tingling  GASTROINTESTINAL: No abdominal or epigastric pain. No nausea, vomiting, or hematemesis; No diarrhea or constipation. No melena or hematochezia.  GENITOURINARY: No dysuria, frequency, hematuria, or incontinence  NEUROLOGICAL: No headaches, memory loss, loss of strength, numbness, or tremors  SKIN: No itching, burning, rashes, or lesions   LYMPH NODES: No enlarged glands  ENDOCRINE: No heat or cold intolerance; No hair loss  MUSCULOSKELETAL: No joint pain or swelling; No muscle, back, or extremity pain  PSYCHIATRIC: No depression, anxiety, mood swings, or difficulty sleeping  HEME/LYMPH: No easy bruising, or bleeding gums  ALLERGY AND IMMUNOLOGIC: No hives or eczema    ALL ROS REVIEWED AND NORMAL EXCEPT AS STATED ABOVE 02-Jul-2025 18:17

## 2025-07-02 NOTE — ED PROVIDER NOTE - PROGRESS NOTE DETAILS
Pt feeling improved, though persistently hypoxic on RA (spO2 86%), will plan for admission. Left msg for call back for PCP Dr. Banis. - Marci Lorenzo PA-C Called back by office of Dr. Bains, does not admit to anyone in particular at [facility]. Dr. Fransisca gonzales. - CHRISTINA GarciaC

## 2025-07-02 NOTE — ED ADULT NURSE NOTE - OBJECTIVE STATEMENT
70y Axox4 F arrived from work complaining of SOB. PMH asthma, HTN. Pt endorses rhinorrhea, SOB, wheezing, cough since Friday. Pt endorses outpatient MD prescribed Z pac that has not relieved symptoms. Pt endorses taking 1 nebulizer PTA. Pt 85% oxygen saturation on RA, placed on 2L NC with oxygen sat improvements to 95%. Pt ambulates independently.

## 2025-07-02 NOTE — CONSULT NOTE ADULT - SUBJECTIVE AND OBJECTIVE BOX
PULMONARY CONSULT    HPI: 71 y/o F with PMH of HTN, asthma (prior hospitalization, never intubated). Presents with nasal congestion, cough, and SOB for 6 days. Reports cough with yellow sputum production. Pt f/u with PCP during this time, started on zpack and taking for past 5 days. Pt also takes an inhaled steroid daily. Was referred for CXR but has not gone yet. Reports worsening SOB so came to ED.           PAST MEDICAL & SURGICAL HISTORY:  Asthma  No significant past surgical history      Allergies  shellfish (Hives)  losartan (Angioedema)  Fish Products (Hives)      FAMILY HISTORY:  No pertinent family history in first degree relatives      Social history:     Review of Systems:  CONSTITUTIONAL: No fever, chills, or fatigue  EYES: No eye pain, visual disturbances, or discharge  ENMT:  No difficulty hearing, tinnitus, vertigo; No sinus or throat pain  NECK: No pain or stiffness  RESPIRATORY: Per above  CARDIOVASCULAR: No chest pain, palpitations, dizziness, or leg swelling  GASTROINTESTINAL: No abdominal or epigastric pain. No nausea, vomiting, or hematemesis; No diarrhea or constipation. No melena or hematochezia.  GENITOURINARY: No dysuria, frequency, hematuria, or incontinence  NEUROLOGICAL: No headaches, memory loss, loss of strength, numbness, or tremors  SKIN: No itching, burning, rashes, or lesions   MUSCULOSKELETAL: No joint pain or swelling; No muscle, back, or extremity pain  PSYCHIATRIC: No depression, anxiety, mood swings, or difficulty sleeping      Medications:  MEDICATIONS  (STANDING):  potassium chloride    Tablet ER 40 milliEquivalent(s) Oral once          Vital Signs Last 24 Hrs  T(C): 37 (02 Jul 2025 11:45), Max: 37 (02 Jul 2025 11:45)  T(F): 98.6 (02 Jul 2025 11:45), Max: 98.6 (02 Jul 2025 11:45)  HR: 74 (02 Jul 2025 11:45) (74 - 97)  BP: 137/65 (02 Jul 2025 11:45) (137/65 - 147/70)  BP(mean): 95 (02 Jul 2025 11:33) (95 - 95)  RR: 20 (02 Jul 2025 11:45) (20 - 24)  SpO2: 97% (02 Jul 2025 11:45) (89% - 97%)    Parameters below as of 02 Jul 2025 11:45  Patient On (Oxygen Delivery Method): nasal cannula  O2 Flow (L/min): 2        VBG pH 7.42 07-02 @ 08:49  VBG pCO2 60 07-02 @ 08:49  VBG O2 sat 64.9 07-02 @ 08:49  VBG lactate 2.5 07-02 @ 08:49            LABS:                        15.1   5.67  )-----------( 193      ( 02 Jul 2025 08:50 )             48.0     07-02    140  |  98  |  9   ----------------------------<  156[H]  3.2[L]   |  23  |  0.67    Ca    9.3      02 Jul 2025 08:50  Mg     2.1     07-02    TPro  7.9  /  Alb  4.2  /  TBili  0.4  /  DBili  x   /  AST  25  /  ALT  24  /  AlkPhos  85  07-02          CAPILLARY BLOOD GLUCOSE          Urinalysis Basic - ( 02 Jul 2025 08:50 )    Color: x / Appearance: x / SG: x / pH: x  Gluc: 156 mg/dL / Ketone: x  / Bili: x / Urobili: x   Blood: x / Protein: x / Nitrite: x   Leuk Esterase: x / RBC: x / WBC x   Sq Epi: x / Non Sq Epi: x / Bacteria: x      Procalcitonin: 0.03 ng/mL (07-02-25 @ 08:50)            Physical Examination:    General: No acute distress.      HEENT: Pupils equal, reactive to light.  Symmetric.    PULM: Clear to auscultation bilaterally, no significant sputum production    CVS: S1, S2    ABD: Soft, nondistended, nontender, normoactive bowel sounds, no masses    EXT: No edema, nontender    SKIN: Warm and well perfused, no rashes noted.    NEURO: Alert, oriented, interactive, nonfocal      RADIOLOGY REVIEWED  CXR: small R basilar opacity    PULMONARY CONSULT    HPI: 69 y/o F with PMH of HTN, asthma (prior hospitalization, never intubated). Presents with nasal congestion, cough, and SOB for 6 days. Reports cough with yellow sputum production. Pt f/u with PCP during this time, started on zpack and taking for past 5 days. Pt also takes an inhaled steroid daily. Was referred for CXR but has not gone yet. Reports worsening SOB so came to ED. +Wheezing and hypoxia. CXR with possible PNA. Denies fevers, chills, CP, pleuritic CP.           PAST MEDICAL & SURGICAL HISTORY:  Asthma  No significant past surgical history      Allergies  shellfish (Hives)  losartan (Angioedema)  Fish Products (Hives)      FAMILY HISTORY:  No pertinent family history in first degree relatives      Social history:     Review of Systems:  CONSTITUTIONAL: No fever, chills, or fatigue  EYES: No eye pain, visual disturbances, or discharge  ENMT:  No difficulty hearing, tinnitus, vertigo; No sinus or throat pain  NECK: No pain or stiffness  RESPIRATORY: Per above  CARDIOVASCULAR: No chest pain, palpitations, dizziness, or leg swelling  GASTROINTESTINAL: No abdominal or epigastric pain. No nausea, vomiting, or hematemesis; No diarrhea or constipation. No melena or hematochezia.  GENITOURINARY: No dysuria, frequency, hematuria, or incontinence  NEUROLOGICAL: No headaches, memory loss, loss of strength, numbness, or tremors  SKIN: No itching, burning, rashes, or lesions   MUSCULOSKELETAL: No joint pain or swelling; No muscle, back, or extremity pain  PSYCHIATRIC: No depression, anxiety, mood swings, or difficulty sleeping      Medications:  MEDICATIONS  (STANDING):  potassium chloride    Tablet ER 40 milliEquivalent(s) Oral once          Vital Signs Last 24 Hrs  T(C): 37 (02 Jul 2025 11:45), Max: 37 (02 Jul 2025 11:45)  T(F): 98.6 (02 Jul 2025 11:45), Max: 98.6 (02 Jul 2025 11:45)  HR: 74 (02 Jul 2025 11:45) (74 - 97)  BP: 137/65 (02 Jul 2025 11:45) (137/65 - 147/70)  BP(mean): 95 (02 Jul 2025 11:33) (95 - 95)  RR: 20 (02 Jul 2025 11:45) (20 - 24)  SpO2: 97% (02 Jul 2025 11:45) (89% - 97%)    Parameters below as of 02 Jul 2025 11:45  Patient On (Oxygen Delivery Method): nasal cannula  O2 Flow (L/min): 2        VBG pH 7.42 07-02 @ 08:49  VBG pCO2 60 07-02 @ 08:49  VBG O2 sat 64.9 07-02 @ 08:49  VBG lactate 2.5 07-02 @ 08:49            LABS:                        15.1   5.67  )-----------( 193      ( 02 Jul 2025 08:50 )             48.0     07-02    140  |  98  |  9   ----------------------------<  156[H]  3.2[L]   |  23  |  0.67    Ca    9.3      02 Jul 2025 08:50  Mg     2.1     07-02    TPro  7.9  /  Alb  4.2  /  TBili  0.4  /  DBili  x   /  AST  25  /  ALT  24  /  AlkPhos  85  07-02          CAPILLARY BLOOD GLUCOSE          Urinalysis Basic - ( 02 Jul 2025 08:50 )    Color: x / Appearance: x / SG: x / pH: x  Gluc: 156 mg/dL / Ketone: x  / Bili: x / Urobili: x   Blood: x / Protein: x / Nitrite: x   Leuk Esterase: x / RBC: x / WBC x   Sq Epi: x / Non Sq Epi: x / Bacteria: x      Procalcitonin: 0.03 ng/mL (07-02-25 @ 08:50)            Physical Examination:    General: No acute distress.      HEENT: Pupils equal, reactive to light.  Symmetric.    PULM: b/l expiratory wheezes     CVS: S1, S2    ABD: Soft, nondistended, nontender, normoactive bowel sounds, no masses    EXT: No edema, nontender    SKIN: Warm and well perfused, no rashes noted.    NEURO: Alert, oriented, interactive, nonfocal      RADIOLOGY REVIEWED  CXR: small R basilar opacity

## 2025-07-02 NOTE — H&P ADULT - PROBLEM SELECTOR PLAN 2
Neb , steroid and Oxygen .  Pulmonary consulted. Neb , steroid and Oxygen .  Pulmonary help appreciated.  < from: CT Chest No Cont (07.02.25 @ 16:58) >    IMPRESSION:  Bronchial wall thickening with distal impaction in the mid to lower lungs.    < end of copied text >

## 2025-07-02 NOTE — CONSULT NOTE ADULT - NS ATTEND AMEND GEN_ALL_CORE FT
-Start solumedrol 20mg IVP q8h  -Start Duoneb q6h  -Start Advair 250/50 1 puff BID  -Start Spiriva 2 puffs qd  -Keep sats >90% with o2 PRN  -Check ct chest

## 2025-07-03 LAB
ANION GAP SERPL CALC-SCNC: 19 MMOL/L — HIGH (ref 5–17)
BUN SERPL-MCNC: 15 MG/DL — SIGNIFICANT CHANGE UP (ref 7–23)
CALCIUM SERPL-MCNC: 9.4 MG/DL — SIGNIFICANT CHANGE UP (ref 8.4–10.5)
CHLORIDE SERPL-SCNC: 100 MMOL/L — SIGNIFICANT CHANGE UP (ref 96–108)
CO2 SERPL-SCNC: 22 MMOL/L — SIGNIFICANT CHANGE UP (ref 22–31)
CREAT SERPL-MCNC: 0.64 MG/DL — SIGNIFICANT CHANGE UP (ref 0.5–1.3)
EGFR: 96 ML/MIN/1.73M2 — SIGNIFICANT CHANGE UP
EGFR: 96 ML/MIN/1.73M2 — SIGNIFICANT CHANGE UP
GLUCOSE SERPL-MCNC: 161 MG/DL — HIGH (ref 70–99)
HCT VFR BLD CALC: 44.4 % — SIGNIFICANT CHANGE UP (ref 34.5–45)
HGB BLD-MCNC: 14.4 G/DL — SIGNIFICANT CHANGE UP (ref 11.5–15.5)
MCHC RBC-ENTMCNC: 28.6 PG — SIGNIFICANT CHANGE UP (ref 27–34)
MCHC RBC-ENTMCNC: 32.4 G/DL — SIGNIFICANT CHANGE UP (ref 32–36)
MCV RBC AUTO: 88.1 FL — SIGNIFICANT CHANGE UP (ref 80–100)
NRBC # BLD AUTO: 0 K/UL — SIGNIFICANT CHANGE UP (ref 0–0)
NRBC # FLD: 0 K/UL — SIGNIFICANT CHANGE UP (ref 0–0)
NRBC BLD AUTO-RTO: 0 /100 WBCS — SIGNIFICANT CHANGE UP (ref 0–0)
PLATELET # BLD AUTO: 206 K/UL — SIGNIFICANT CHANGE UP (ref 150–400)
PMV BLD: 13 FL — SIGNIFICANT CHANGE UP (ref 7–13)
POTASSIUM SERPL-MCNC: 3.8 MMOL/L — SIGNIFICANT CHANGE UP (ref 3.5–5.3)
POTASSIUM SERPL-SCNC: 3.8 MMOL/L — SIGNIFICANT CHANGE UP (ref 3.5–5.3)
RAPID RVP RESULT: SIGNIFICANT CHANGE UP
RBC # BLD: 5.04 M/UL — SIGNIFICANT CHANGE UP (ref 3.8–5.2)
RBC # FLD: 13.7 % — SIGNIFICANT CHANGE UP (ref 10.3–14.5)
SARS-COV-2 RNA SPEC QL NAA+PROBE: SIGNIFICANT CHANGE UP
SODIUM SERPL-SCNC: 141 MMOL/L — SIGNIFICANT CHANGE UP (ref 135–145)
WBC # BLD: 8.37 K/UL — SIGNIFICANT CHANGE UP (ref 3.8–10.5)
WBC # FLD AUTO: 8.37 K/UL — SIGNIFICANT CHANGE UP (ref 3.8–10.5)

## 2025-07-03 RX ADMIN — CEFTRIAXONE 100 MILLIGRAM(S): 500 INJECTION, POWDER, FOR SOLUTION INTRAMUSCULAR; INTRAVENOUS at 21:10

## 2025-07-03 RX ADMIN — Medication 250 MILLIGRAM(S): at 21:11

## 2025-07-03 RX ADMIN — METHYLPREDNISOLONE ACETATE 20 MILLIGRAM(S): 80 INJECTION, SUSPENSION INTRA-ARTICULAR; INTRALESIONAL; INTRAMUSCULAR; SOFT TISSUE at 21:11

## 2025-07-03 RX ADMIN — IPRATROPIUM BROMIDE AND ALBUTEROL SULFATE 3 MILLILITER(S): .5; 2.5 SOLUTION RESPIRATORY (INHALATION) at 06:48

## 2025-07-03 RX ADMIN — METHYLPREDNISOLONE ACETATE 20 MILLIGRAM(S): 80 INJECTION, SUSPENSION INTRA-ARTICULAR; INTRALESIONAL; INTRAMUSCULAR; SOFT TISSUE at 13:46

## 2025-07-03 RX ADMIN — MONTELUKAST SODIUM 10 MILLIGRAM(S): 10 TABLET ORAL at 21:12

## 2025-07-03 RX ADMIN — IPRATROPIUM BROMIDE AND ALBUTEROL SULFATE 3 MILLILITER(S): .5; 2.5 SOLUTION RESPIRATORY (INHALATION) at 18:47

## 2025-07-03 RX ADMIN — AMLODIPINE BESYLATE 10 MILLIGRAM(S): 10 TABLET ORAL at 06:49

## 2025-07-03 RX ADMIN — FLUTICASONE PROPIONATE 1 SPRAY(S): 50 SPRAY, METERED NASAL at 06:48

## 2025-07-03 RX ADMIN — Medication 1 DOSE(S): at 18:47

## 2025-07-03 RX ADMIN — ENOXAPARIN SODIUM 40 MILLIGRAM(S): 100 INJECTION SUBCUTANEOUS at 21:11

## 2025-07-03 RX ADMIN — TIOTROPIUM BROMIDE INHALATION SPRAY 2 PUFF(S): 3.12 SPRAY, METERED RESPIRATORY (INHALATION) at 11:52

## 2025-07-03 RX ADMIN — Medication 1 DOSE(S): at 06:48

## 2025-07-03 RX ADMIN — IPRATROPIUM BROMIDE AND ALBUTEROL SULFATE 3 MILLILITER(S): .5; 2.5 SOLUTION RESPIRATORY (INHALATION) at 11:47

## 2025-07-03 RX ADMIN — FLUTICASONE PROPIONATE 1 SPRAY(S): 50 SPRAY, METERED NASAL at 18:47

## 2025-07-03 RX ADMIN — ATORVASTATIN CALCIUM 10 MILLIGRAM(S): 80 TABLET, FILM COATED ORAL at 21:12

## 2025-07-03 RX ADMIN — METHYLPREDNISOLONE ACETATE 20 MILLIGRAM(S): 80 INJECTION, SUSPENSION INTRA-ARTICULAR; INTRALESIONAL; INTRAMUSCULAR; SOFT TISSUE at 06:47

## 2025-07-03 NOTE — PATIENT PROFILE ADULT - FALL HARM RISK - HARM RISK INTERVENTIONS

## 2025-07-03 NOTE — PROGRESS NOTE ADULT - NS ATTEND AMEND GEN_ALL_CORE FT
-Continue Duoneb q6h  -Continue Advair 250/50 1 puff BID  -Continue Spiriva 2 puffs qd  -O2 sats borderline on room air when seen this AM. Continue Solumedrol 20 mg IVP q8h for now, Can change to prednisone in am if improved would do 60mg x3d then 40mg x3d then 20mgx 3d then 10mg/d fu as outpt  -Keep sats >90% with o2 PRN.  -kimberley paulino AND PT

## 2025-07-03 NOTE — PROGRESS NOTE ADULT - PROBLEM SELECTOR PLAN 2
2nd to asthma exacerbation, ?PNA  -Duoneb q6h  -Advair 250/50 1 puff BID  -Spiriva 2 puffs qd  -Steroids as above   -Keep sats >90% with o2 PRN.

## 2025-07-03 NOTE — PROGRESS NOTE ADULT - PROBLEM SELECTOR PLAN 1
2nd to asthma exacerbation, ? PNA  -Continue Duoneb q6h  -Continue Advair 250/50 1 puff BID  -Continue Spiriva 2 puffs qd  -O2 sats borderline on room air when seen this AM. Continue Solumedrol 20 mg IVP q8h for now, will re-evaluate this afternoon.   -Keep sats >90% with o2 PRN.

## 2025-07-03 NOTE — PROGRESS NOTE ADULT - PROBLEM SELECTOR PLAN 3
CXR with possible PNA  -CT chest with bronchial wall thickening, impacted airways  -Continue ABX for now, will review with Dr. Granda.

## 2025-07-04 ENCOUNTER — TRANSCRIPTION ENCOUNTER (OUTPATIENT)
Age: 70
End: 2025-07-04

## 2025-07-04 VITALS
DIASTOLIC BLOOD PRESSURE: 74 MMHG | TEMPERATURE: 98 F | HEART RATE: 85 BPM | SYSTOLIC BLOOD PRESSURE: 144 MMHG | OXYGEN SATURATION: 94 % | RESPIRATION RATE: 18 BRPM

## 2025-07-04 LAB
ANION GAP SERPL CALC-SCNC: 18 MMOL/L — HIGH (ref 5–17)
BUN SERPL-MCNC: 17 MG/DL — SIGNIFICANT CHANGE UP (ref 7–23)
CALCIUM SERPL-MCNC: 9.4 MG/DL — SIGNIFICANT CHANGE UP (ref 8.4–10.5)
CHLORIDE SERPL-SCNC: 100 MMOL/L — SIGNIFICANT CHANGE UP (ref 96–108)
CO2 SERPL-SCNC: 23 MMOL/L — SIGNIFICANT CHANGE UP (ref 22–31)
CREAT SERPL-MCNC: 0.69 MG/DL — SIGNIFICANT CHANGE UP (ref 0.5–1.3)
EGFR: 94 ML/MIN/1.73M2 — SIGNIFICANT CHANGE UP
EGFR: 94 ML/MIN/1.73M2 — SIGNIFICANT CHANGE UP
GLUCOSE SERPL-MCNC: 144 MG/DL — HIGH (ref 70–99)
HCT VFR BLD CALC: 43.5 % — SIGNIFICANT CHANGE UP (ref 34.5–45)
HGB BLD-MCNC: 14 G/DL — SIGNIFICANT CHANGE UP (ref 11.5–15.5)
MCHC RBC-ENTMCNC: 28.3 PG — SIGNIFICANT CHANGE UP (ref 27–34)
MCHC RBC-ENTMCNC: 32.2 G/DL — SIGNIFICANT CHANGE UP (ref 32–36)
MCV RBC AUTO: 87.9 FL — SIGNIFICANT CHANGE UP (ref 80–100)
NRBC # BLD AUTO: 0 K/UL — SIGNIFICANT CHANGE UP (ref 0–0)
NRBC # FLD: 0 K/UL — SIGNIFICANT CHANGE UP (ref 0–0)
NRBC BLD AUTO-RTO: 0 /100 WBCS — SIGNIFICANT CHANGE UP (ref 0–0)
PLATELET # BLD AUTO: 205 K/UL — SIGNIFICANT CHANGE UP (ref 150–400)
PMV BLD: 12.9 FL — SIGNIFICANT CHANGE UP (ref 7–13)
POTASSIUM SERPL-MCNC: 3.6 MMOL/L — SIGNIFICANT CHANGE UP (ref 3.5–5.3)
POTASSIUM SERPL-SCNC: 3.6 MMOL/L — SIGNIFICANT CHANGE UP (ref 3.5–5.3)
RBC # BLD: 4.95 M/UL — SIGNIFICANT CHANGE UP (ref 3.8–5.2)
RBC # FLD: 14 % — SIGNIFICANT CHANGE UP (ref 10.3–14.5)
SODIUM SERPL-SCNC: 141 MMOL/L — SIGNIFICANT CHANGE UP (ref 135–145)
WBC # BLD: 13.8 K/UL — HIGH (ref 3.8–10.5)
WBC # FLD AUTO: 13.8 K/UL — HIGH (ref 3.8–10.5)

## 2025-07-04 PROCEDURE — 85014 HEMATOCRIT: CPT

## 2025-07-04 PROCEDURE — 94640 AIRWAY INHALATION TREATMENT: CPT

## 2025-07-04 PROCEDURE — 82947 ASSAY GLUCOSE BLOOD QUANT: CPT

## 2025-07-04 PROCEDURE — 85027 COMPLETE CBC AUTOMATED: CPT

## 2025-07-04 PROCEDURE — 85018 HEMOGLOBIN: CPT

## 2025-07-04 PROCEDURE — 85025 COMPLETE CBC W/AUTO DIFF WBC: CPT

## 2025-07-04 PROCEDURE — 93005 ELECTROCARDIOGRAM TRACING: CPT

## 2025-07-04 PROCEDURE — 0241U: CPT

## 2025-07-04 PROCEDURE — 80053 COMPREHEN METABOLIC PANEL: CPT

## 2025-07-04 PROCEDURE — 84145 PROCALCITONIN (PCT): CPT

## 2025-07-04 PROCEDURE — 71250 CT THORAX DX C-: CPT

## 2025-07-04 PROCEDURE — 80048 BASIC METABOLIC PNL TOTAL CA: CPT

## 2025-07-04 PROCEDURE — 0225U NFCT DS DNA&RNA 21 SARSCOV2: CPT

## 2025-07-04 PROCEDURE — 99285 EMERGENCY DEPT VISIT HI MDM: CPT

## 2025-07-04 PROCEDURE — 82330 ASSAY OF CALCIUM: CPT

## 2025-07-04 PROCEDURE — 87637 SARSCOV2&INF A&B&RSV AMP PRB: CPT

## 2025-07-04 PROCEDURE — 96374 THER/PROPH/DIAG INJ IV PUSH: CPT

## 2025-07-04 PROCEDURE — 71046 X-RAY EXAM CHEST 2 VIEWS: CPT

## 2025-07-04 PROCEDURE — 83735 ASSAY OF MAGNESIUM: CPT

## 2025-07-04 PROCEDURE — 36415 COLL VENOUS BLD VENIPUNCTURE: CPT

## 2025-07-04 PROCEDURE — 82803 BLOOD GASES ANY COMBINATION: CPT

## 2025-07-04 PROCEDURE — 84295 ASSAY OF SERUM SODIUM: CPT

## 2025-07-04 PROCEDURE — 84132 ASSAY OF SERUM POTASSIUM: CPT

## 2025-07-04 PROCEDURE — 82435 ASSAY OF BLOOD CHLORIDE: CPT

## 2025-07-04 PROCEDURE — 83605 ASSAY OF LACTIC ACID: CPT

## 2025-07-04 RX ORDER — TIOTROPIUM BROMIDE INHALATION SPRAY 3.12 UG/1
2 SPRAY, METERED RESPIRATORY (INHALATION)
Qty: 0 | Refills: 0 | DISCHARGE
Start: 2025-07-04

## 2025-07-04 RX ORDER — CEFUROXIME SODIUM 1.5 G
1 VIAL (EA) INJECTION
Qty: 11 | Refills: 0
Start: 2025-07-04 | End: 2025-07-08

## 2025-07-04 RX ORDER — PREDNISONE 20 MG/1
1 TABLET ORAL
Qty: 60 | Refills: 0
Start: 2025-07-04 | End: 2025-08-02

## 2025-07-04 RX ORDER — AZITHROMYCIN 250 MG
1 CAPSULE ORAL
Qty: 3 | Refills: 0
Start: 2025-07-04 | End: 2025-07-06

## 2025-07-04 RX ORDER — ALBUTEROL SULFATE 2.5 MG/3ML
1 VIAL, NEBULIZER (ML) INHALATION
Refills: 0 | DISCHARGE

## 2025-07-04 RX ORDER — IPRATROPIUM BROMIDE AND ALBUTEROL SULFATE .5; 2.5 MG/3ML; MG/3ML
3 SOLUTION RESPIRATORY (INHALATION)
Qty: 28 | Refills: 1
Start: 2025-07-04 | End: 2025-07-17

## 2025-07-04 RX ADMIN — IPRATROPIUM BROMIDE AND ALBUTEROL SULFATE 3 MILLILITER(S): .5; 2.5 SOLUTION RESPIRATORY (INHALATION) at 00:34

## 2025-07-04 RX ADMIN — Medication 1 DOSE(S): at 06:22

## 2025-07-04 RX ADMIN — IPRATROPIUM BROMIDE AND ALBUTEROL SULFATE 3 MILLILITER(S): .5; 2.5 SOLUTION RESPIRATORY (INHALATION) at 13:11

## 2025-07-04 RX ADMIN — AMLODIPINE BESYLATE 10 MILLIGRAM(S): 10 TABLET ORAL at 06:24

## 2025-07-04 RX ADMIN — METHYLPREDNISOLONE ACETATE 20 MILLIGRAM(S): 80 INJECTION, SUSPENSION INTRA-ARTICULAR; INTRALESIONAL; INTRAMUSCULAR; SOFT TISSUE at 06:21

## 2025-07-04 RX ADMIN — METHYLPREDNISOLONE ACETATE 20 MILLIGRAM(S): 80 INJECTION, SUSPENSION INTRA-ARTICULAR; INTRALESIONAL; INTRAMUSCULAR; SOFT TISSUE at 13:11

## 2025-07-04 RX ADMIN — TIOTROPIUM BROMIDE INHALATION SPRAY 2 PUFF(S): 3.12 SPRAY, METERED RESPIRATORY (INHALATION) at 13:15

## 2025-07-04 RX ADMIN — IPRATROPIUM BROMIDE AND ALBUTEROL SULFATE 3 MILLILITER(S): .5; 2.5 SOLUTION RESPIRATORY (INHALATION) at 06:22

## 2025-07-04 RX ADMIN — FLUTICASONE PROPIONATE 1 SPRAY(S): 50 SPRAY, METERED NASAL at 06:22

## 2025-07-04 NOTE — DISCHARGE NOTE PROVIDER - NSDCFUADDAPPT_GEN_ALL_CORE_FT
APPTS ARE READY TO BE MADE: [ ] YES    Best Family or Patient Contact (if needed):    Additional Information about above appointments (if needed):    1: PCP 1 week  2:   3:     Other comments or requests:    APPTS ARE READY TO BE MADE: [X] YES    Best Family or Patient Contact (if needed):    Additional Information about above appointments (if needed):    1: PCP 1 week  2: Pulm Dr. Florez  3: Cardiology    Other comments or requests:    APPTS ARE READY TO BE MADE: [X] YES    Best Family or Patient Contact (if needed):    Additional Information about above appointments (if needed):    1: PCP 1 week  2: Pulm Dr. Florez  3: Cardiology    Other comments or requests:     Int med:  Patient informed us they already have secured a follow up appointment which is not visible on [facility] and declined to provide appointment details.     Pulm:  Patient informed us they already have secured a follow up appointment which is not visible on [facility] and declined to provide appointment details.     Cardio Vasc:  Patient informed us they already have secured a follow up appointment which is not visible on [facility] and declined to provide appointment details.

## 2025-07-04 NOTE — DISCHARGE NOTE PROVIDER - NSDCMRMEDTOKEN_GEN_ALL_CORE_FT
albuterol 90 mcg/inh inhalation aerosol: 1 puff(s) inhaled every 6 hours as needed  amLODIPine 10 mg oral tablet: 1 tab(s) orally once a day  Azithromycin 5 Day Dose Pack 250 mg oral tablet: 2 tab(s) orally Day 1 then 1 tablet once a day on days 4 to 5  Breztri Aerosphere 160 mcg-9 mcg-4.8 mcg/inh inhalation aerosol: 2 puff(s) inhaled every 12 hours  fluticasone 50 mcg/inh nasal spray: 1 spray(s) in each nostril once a day  hydroCHLOROthiazide 50 mg oral tablet: 1 tab(s) orally once a day (in the morning)  montelukast 10 mg oral tablet: 1 tab(s) orally once a day (in the evening)  simvastatin 10 mg oral tablet: 1 tab(s) orally once a day (in the evening)   amLODIPine 10 mg oral tablet: 1 tab(s) orally once a day  Breztri Aerosphere 160 mcg-9 mcg-4.8 mcg/inh inhalation aerosol: 2 puff(s) inhaled every 12 hours  cefuroxime 500 mg oral tablet: 1 tab(s) orally 2 times a day last dose in the evening of 07/09/25  fluticasone 50 mcg/inh nasal spray: 1 spray(s) in each nostril once a day  hydroCHLOROthiazide 50 mg oral tablet: 1 tab(s) orally once a day (in the morning)  ipratropium-albuterol 0.5 mg-2.5 mg/3 mL inhalation solution: 3 milliliter(s) inhaled every 6 hours as needed for  shortness of breath and/or wheezing  montelukast 10 mg oral tablet: 1 tab(s) orally once a day (in the evening)  predniSONE 10 mg oral tablet: 1 tab(s) orally once a day Take 6 tablets once a day from 07/05 through 07/07, then  Take 4 tablets once a day from 07/08 through 07/10, then  Take 2 tablets once a day from 07/11 through 07/13, then  Take 1 tablet once a day until your outpatient pulmonology follow up  simvastatin 10 mg oral tablet: 1 tab(s) orally once a day (in the evening)  tiotropium 2.5 mcg/inh inhalation aerosol: 2 puff(s) inhaled once a day  Zithromax 500 mg oral tablet: 1 tab(s) orally once a day last dose on 07/07/25

## 2025-07-04 NOTE — PROGRESS NOTE ADULT - ASSESSMENT
71yo F with PMH of HTN, asthma, presenting with nasal congestion, cough, and SOB for 6 days. Reports cough with yellow sputum production. Pt f/u with PCP during this time, started on zpack and taking for past 5 days. Pt also takes an inhaled steroid daily. No current PO steroids. Was referred for CXR but has not gone yet. Reports worsening SOB so came to ED. Reports she has been hospitalized before for asthma, never intubated. Denies fever/chills, CP, abdominal pain, n/v, sick contacts, recent travel.       Problem/Plan - 1:  ·  Problem: Acute respiratory failure with hypoxia.   ·  Plan: Oxygen to keep saturation > 92 % .      Problem/Plan - 2:  ·  Problem: Acute asthma exacerbation.   ·  Plan: Neb , steroid and Oxygen .  Pulmonary help appreciated.  < from: CT Chest No Cont (07.02.25 @ 16:58) >    IMPRESSION:  Bronchial wall thickening with distal impaction in the mid to lower lungs.    < end of copied text >.     Problem/Plan - 3:  ·  Problem: Acute hypokalemia.   ·  Plan: Replacing .     Problem/Plan - 4:  ·  Problem: HTN (hypertension).   ·  Plan: BP medications with hold parameters.    Dispo : DC planning tomorrow.
71yo F with PMH of HTN, asthma, presenting with nasal congestion, cough, and SOB for 6 days. Reports cough with yellow sputum production. Pt f/u with PCP during this time, started on zpack and taking for past 5 days. Pt also takes an inhaled steroid daily. No current PO steroids. Was referred for CXR but has not gone yet. Reports worsening SOB so came to ED. Reports she has been hospitalized before for asthma, never intubated. Denies fever/chills, CP, abdominal pain, n/v, sick contacts, recent travel.       Problem/Plan - 1:  ·  Problem: Acute respiratory failure with hypoxia.   ·  Plan: Oxygen to keep saturation > 92 % .      Problem/Plan - 2:  ·  Problem: Acute asthma exacerbation.   ·  Plan: Neb , steroid and Oxygen .  Pulmonary help appreciated.  < from: CT Chest No Cont (07.02.25 @ 16:58) >    IMPRESSION:  Bronchial wall thickening with distal impaction in the mid to lower lungs.    < end of copied text >.     Problem/Plan - 3:  ·  Problem: Acute hypokalemia.   ·  Plan: Replacing .     Problem/Plan - 4:  ·  Problem: HTN (hypertension).   ·  Plan: BP medications with hold parameters.    Dispo : DC planning home today as cleared by Pulmonary and cardiology to F/U out patient .  
69 y/o F with PMH of HTN, asthma (prior hospitalization, never intubated). Presents with nasal congestion, cough, and SOB for 6 days. Reports cough with yellow sputum production. Pt f/u with PCP during this time, started on zpack and taking for past 5 days. Pt also takes an inhaled steroid daily. Was referred for CXR but has not gone yet. Reports worsening SOB so came to ED. +Wheezing and hypoxia. CXR with possible PNA.

## 2025-07-04 NOTE — CONSULT NOTE ADULT - SUBJECTIVE AND OBJECTIVE BOX
Cardiovascular Disease Initial Evaluation  Date of service: 07-04-25 @ 11:33    CHIEF COMPLAINT: Asthma     HISTORY OF PRESENT ILLNESS:  Ms. Nathan is a pleasant  71yo F with PMH of HTN, asthma, presenting with nasal congestion, cough, and SOB for 6 days. Reports cough with yellow sputum production. Pt f/u with PCP during this time, started on zpack and taking for past 5 days. Pt also takes an inhaled steroid daily. No current PO steroids. Was referred for CXR but has not gone yet. Reports worsening SOB so came to ED. Reports she has been hospitalized before for asthma, never intubated. Denies fever/chills, CP, abdominal pain, n/v, sick contacts, recent travel. Admitted for asthma exacerbation which has improved. Patient noted to have an episode of bradycardia overnight. Denies lightheadedness or syncope in the past. Denies CAD history. Of note, patient admits to snoring and was recommended to undergo sleep apnea testing however has not done so.         Allergies    shellfish (Hives)  losartan (Angioedema)  Fish Products (Hives)    Intolerances    	    MEDICATIONS:  amLODIPine   Tablet 10 milliGRAM(s) Oral daily  enoxaparin Injectable 40 milliGRAM(s) SubCutaneous every 24 hours  hydrochlorothiazide 50 milliGRAM(s) Oral daily    azithromycin  IVPB 500 milliGRAM(s) IV Intermittent every 24 hours  cefTRIAXone   IVPB 1000 milliGRAM(s) IV Intermittent every 24 hours    albuterol/ipratropium for Nebulization 3 milliLiter(s) Nebulizer every 6 hours  fluticasone propionate/ salmeterol 250-50 MICROgram(s) Diskus 1 Dose(s) Inhalation two times a day  montelukast 10 milliGRAM(s) Oral at bedtime  tiotropium 2.5 MICROgram(s) Inhaler 2 Puff(s) Inhalation daily        atorvastatin 10 milliGRAM(s) Oral at bedtime  methylPREDNISolone sodium succinate Injectable 20 milliGRAM(s) IV Push every 8 hours    fluticasone propionate 50 MICROgram(s)/spray Nasal Spray 1 Spray(s) Both Nostrils two times a day      PAST MEDICAL & SURGICAL HISTORY:  Asthma      No significant past surgical history          FAMILY HISTORY:  No pertinent family history in first degree relatives        SOCIAL HISTORY:    The patient is a nonsmoker       REVIEW OF SYSTEMS:  See HPI, otherwise complete 14 point review of systems negative    [x ] All others negative	  [ ] Unable to obtain    PHYSICAL EXAM:  T(C): 36.6 (07-04-25 @ 04:47), Max: 37.1 (07-03-25 @ 15:53)  HR: 70 (07-04-25 @ 04:47) (68 - 140)  BP: 147/69 (07-04-25 @ 04:47) (122/74 - 147/69)  RR: 18 (07-04-25 @ 04:47) (18 - 18)  SpO2: 99% (07-04-25 @ 04:47) (89% - 99%)  Wt(kg): --  I&O's Summary    03 Jul 2025 07:01  -  04 Jul 2025 07:00  --------------------------------------------------------  IN: 220 mL / OUT: 0 mL / NET: 220 mL    04 Jul 2025 07:01  -  04 Jul 2025 11:33  --------------------------------------------------------  IN: 240 mL / OUT: 0 mL / NET: 240 mL        Appearance: No Acute Distress; resting comfortably  HEENT:  Normal oral mucosa, PERRL, EOMI	  Cardiovascular: Normal S1 S2, No JVD, No murmurs/rubs/gallops  Respiratory: Normal respiratory effort; Lungs clear to auscultation bilaterally  Gastrointestinal:  Soft, Non-tender, + BS	  Skin: No rashes, No ecchymoses, No cyanosis	  Neurologic: Non-focal; no weakness  Extremities: No clubbing, cyanosis or edema  Vascular: Peripheral pulses palpable 2+ bilaterally  Psychiatry: A & O x 3, Mood & affect appropriate    Laboratory Data:	 	    CBC Full  -  ( 04 Jul 2025 07:08 )  WBC Count : 13.80 K/uL  Hemoglobin : 14.0 g/dL  Hematocrit : 43.5 %  Platelet Count - Automated : 205 K/uL  Mean Cell Volume : 87.9 fl  Mean Cell Hemoglobin : 28.3 pg  Mean Cell Hemoglobin Concentration : 32.2 g/dL  Auto Neutrophil # : x  Auto Lymphocyte # : x  Auto Monocyte # : x  Auto Eosinophil # : x  Auto Basophil # : x  Auto Neutrophil % : x  Auto Lymphocyte % : x  Auto Monocyte % : x  Auto Eosinophil % : x  Auto Basophil % : x    07-04    141  |  100  |  17  ----------------------------<  144[H]  3.6   |  23  |  0.69  07-03    141  |  100  |  15  ----------------------------<  161[H]  3.8   |  22  |  0.64    Ca    9.4      04 Jul 2025 07:06  Ca    9.4      03 Jul 2025 07:09        proBNP:   Lipid Profile:   HgA1c:   TSH:       CARDIAC MARKERS:            Interpretation of Telemetry: N/a    ECG:  	Sinus rhythm with LVH and repolarization abnormalities.   RADIOLOGY:  OTHER: 	    PREVIOUS DIAGNOSTIC TESTING:    [ ] Echocardiogram:  [ ] Catheterization:  [ ] Stress Test:  	    Assessment:   71yo F with PMH of HTN, asthma, presenting with nasal congestion, cough, and SOB for 6 days.    Plan of Care:      #Sinus bradycardia  - Occurred at night  - Asymptomatic  - Likely related to MAYA  - No plan for inpatient work up  - Patient will follow up in my office for cardiac management and monitoring.     #Asthma Exacerbation  - Improved  - Pulm following    #HTN  - BP acceptable     High and Complex medical decision making in a patient with multiple co-morbidities.       77 minutes spent on total encounter; more than 50% of the visit was spent counseling and/or coordinating care by the attending physician.   	  Bladimir Hendrix DO [facility]  Cardiovascular Diseases  [phone]

## 2025-07-04 NOTE — DISCHARGE NOTE PROVIDER - PROVIDER TOKENS
PROVIDER:[TOKEN:[[id number]],FOLLOWUP:[1 week],ESTABLISHEDPATIENT:[T]] PROVIDER:[TOKEN:[[id number]],FOLLOWUP:[1 week],ESTABLISHEDPATIENT:[T]],PROVIDER:[TOKEN:[[id number]],FOLLOWUP:[1 week],ESTABLISHEDPATIENT:[T]],PROVIDER:[TOKEN:[[id number]],FOLLOWUP:[2 weeks]]

## 2025-07-04 NOTE — PROGRESS NOTE ADULT - SUBJECTIVE AND OBJECTIVE BOX
Date of Service  : 07-04-25    INTERVAL HPI/OVERNIGHT EVENTS: I feel much better so want to go home.   Vital Signs Last 24 Hrs  T(C): 36.8 (04 Jul 2025 13:03), Max: 37.1 (03 Jul 2025 15:53)  T(F): 98.3 (04 Jul 2025 13:03), Max: 98.7 (03 Jul 2025 15:53)  HR: 75 (04 Jul 2025 13:03) (68 - 88)  BP: 136/73 (04 Jul 2025 13:03) (132/70 - 147/69)  BP(mean): --  RR: 18 (04 Jul 2025 13:03) (18 - 18)  SpO2: 98% (04 Jul 2025 13:03) (94% - 99%)    Parameters below as of 04 Jul 2025 13:03  Patient On (Oxygen Delivery Method): room air      I&O's Summary    03 Jul 2025 07:01  -  04 Jul 2025 07:00  --------------------------------------------------------  IN: 220 mL / OUT: 0 mL / NET: 220 mL    04 Jul 2025 07:01  -  04 Jul 2025 15:09  --------------------------------------------------------  IN: 480 mL / OUT: 0 mL / NET: 480 mL      MEDICATIONS  (STANDING):  albuterol/ipratropium for Nebulization 3 milliLiter(s) Nebulizer every 6 hours  amLODIPine   Tablet 10 milliGRAM(s) Oral daily  atorvastatin 10 milliGRAM(s) Oral at bedtime  azithromycin  IVPB 500 milliGRAM(s) IV Intermittent every 24 hours  cefTRIAXone   IVPB 1000 milliGRAM(s) IV Intermittent every 24 hours  enoxaparin Injectable 40 milliGRAM(s) SubCutaneous every 24 hours  fluticasone propionate 50 MICROgram(s)/spray Nasal Spray 1 Spray(s) Both Nostrils two times a day  fluticasone propionate/ salmeterol 250-50 MICROgram(s) Diskus 1 Dose(s) Inhalation two times a day  hydrochlorothiazide 50 milliGRAM(s) Oral daily  methylPREDNISolone sodium succinate Injectable 20 milliGRAM(s) IV Push every 8 hours  montelukast 10 milliGRAM(s) Oral at bedtime  tiotropium 2.5 MICROgram(s) Inhaler 2 Puff(s) Inhalation daily    MEDICATIONS  (PRN):    LABS:                        14.0   13.80 )-----------( 205      ( 04 Jul 2025 07:08 )             43.5     07-04    141  |  100  |  17  ----------------------------<  144[H]  3.6   |  23  |  0.69    Ca    9.4      04 Jul 2025 07:06        Urinalysis Basic - ( 04 Jul 2025 07:06 )    Color: x / Appearance: x / SG: x / pH: x  Gluc: 144 mg/dL / Ketone: x  / Bili: x / Urobili: x   Blood: x / Protein: x / Nitrite: x   Leuk Esterase: x / RBC: x / WBC x   Sq Epi: x / Non Sq Epi: x / Bacteria: x      CAPILLARY BLOOD GLUCOSE            Urinalysis Basic - ( 04 Jul 2025 07:06 )    Color: x / Appearance: x / SG: x / pH: x  Gluc: 144 mg/dL / Ketone: x  / Bili: x / Urobili: x   Blood: x / Protein: x / Nitrite: x   Leuk Esterase: x / RBC: x / WBC x   Sq Epi: x / Non Sq Epi: x / Bacteria: x      REVIEW OF SYSTEMS:  CONSTITUTIONAL: No fever, weight loss, or fatigue  EYES: No eye pain, visual disturbances, or discharge  ENMT:  No difficulty hearing, tinnitus, vertigo; No sinus or throat pain  NECK: No pain or stiffness  RESPIRATORY: No cough, wheezing, chills or hemoptysis; No shortness of breath  CARDIOVASCULAR: No chest pain, palpitations, dizziness, or leg swelling  GASTROINTESTINAL: No abdominal or epigastric pain. No nausea, vomiting, or hematemesis; No diarrhea or constipation. No melena or hematochezia.  GENITOURINARY: No dysuria, frequency, hematuria, or incontinence  NEUROLOGICAL: No headaches, memory loss, loss of strength, numbness, or tremors      RADIOLOGY & ADDITIONAL TESTS:    Consultant(s) Notes Reviewed:  [x ] YES  [ ] NO    PHYSICAL EXAM:  GENERAL: NAD, well-groomed, well-developed,not in any distress ,  HEAD:  Atraumatic, Normocephalic  EYES: EOMI, PERRLA, conjunctiva and sclera clear  ENMT: No tonsillar erythema, exudates, or enlargement; Moist mucous membranes, Good dentition, No lesions  NECK: Supple, No JVD, Normal thyroid  NERVOUS SYSTEM:  Alert & Oriented X3, No focal deficit   CHEST/LUNG: Good air entry bilateral with no  rales, rhonchi, wheezing, or rubs  HEART: Regular rate and rhythm; No murmurs, rubs, or gallops  ABDOMEN: Soft, Nontender, Nondistended; Bowel sounds present  EXTREMITIES:  2+ Peripheral Pulses, No clubbing, cyanosis, or edema      Care Discussed with Consultants/Other Providers [ x] YES  [ ] NO
Follow-up Pulm Progress Note    Pt reports feeling much better  SOB resolving  O2 sats 85% on room air at rest, sats increase to 92% with deep breaths  Denies CP     Medications:  MEDICATIONS  (STANDING):  albuterol/ipratropium for Nebulization 3 milliLiter(s) Nebulizer every 6 hours  amLODIPine   Tablet 10 milliGRAM(s) Oral daily  atorvastatin 10 milliGRAM(s) Oral at bedtime  azithromycin  IVPB 500 milliGRAM(s) IV Intermittent every 24 hours  cefTRIAXone   IVPB 1000 milliGRAM(s) IV Intermittent every 24 hours  enoxaparin Injectable 40 milliGRAM(s) SubCutaneous every 24 hours  fluticasone propionate 50 MICROgram(s)/spray Nasal Spray 1 Spray(s) Both Nostrils two times a day  fluticasone propionate/ salmeterol 250-50 MICROgram(s) Diskus 1 Dose(s) Inhalation two times a day  hydrochlorothiazide 50 milliGRAM(s) Oral daily  methylPREDNISolone sodium succinate Injectable 20 milliGRAM(s) IV Push every 8 hours  montelukast 10 milliGRAM(s) Oral at bedtime  tiotropium 2.5 MICROgram(s) Inhaler 2 Puff(s) Inhalation daily    MEDICATIONS  (PRN):          Vital Signs Last 24 Hrs  T(C): 36.9 (03 Jul 2025 04:31), Max: 37 (02 Jul 2025 11:45)  T(F): 98.4 (03 Jul 2025 04:31), Max: 98.6 (02 Jul 2025 11:45)  HR: 66 (03 Jul 2025 04:31) (66 - 95)  BP: 131/74 (03 Jul 2025 04:31) (131/74 - 152/72)  BP(mean): 95 (02 Jul 2025 11:33) (95 - 95)  RR: 18 (03 Jul 2025 04:31) (18 - 20)  SpO2: 93% (03 Jul 2025 04:31) (92% - 97%)    Parameters below as of 03 Jul 2025 04:31  Patient On (Oxygen Delivery Method): room air          VBG pH 7.42 07-02 @ 08:49    VBG pCO2 60 07-02 @ 08:49    VBG O2 sat 64.9 07-02 @ 08:49    VBG lactate 2.5 07-02 @ 08:49      07-02 @ 07:01  -  07-03 @ 07:00  --------------------------------------------------------  IN: 120 mL / OUT: 0 mL / NET: 120 mL          LABS:                        14.4   8.37  )-----------( 206      ( 03 Jul 2025 07:13 )             44.4     07-03    141  |  100  |  15  ----------------------------<  161[H]  3.8   |  22  |  0.64    Ca    9.4      03 Jul 2025 07:09  Mg     2.1     07-02    TPro  7.9  /  Alb  4.2  /  TBili  0.4  /  DBili  x   /  AST  25  /  ALT  24  /  AlkPhos  85  07-02          CAPILLARY BLOOD GLUCOSE          Urinalysis Basic - ( 03 Jul 2025 07:09 )    Color: x / Appearance: x / SG: x / pH: x  Gluc: 161 mg/dL / Ketone: x  / Bili: x / Urobili: x   Blood: x / Protein: x / Nitrite: x   Leuk Esterase: x / RBC: x / WBC x   Sq Epi: x / Non Sq Epi: x / Bacteria: x      Procalcitonin: 0.03 ng/mL (07-02-25 @ 08:50)      Physical Examination:  PULM: Clear to auscultation bilaterally, no significant sputum production  CVS: S1, S2 heard    RADIOLOGY REVIEWED  CXR: Decreased BS, few b/l expiratory wheeze     CT chest:  < from: CT Chest No Cont (07.02.25 @ 16:58) >    ACC: [id number] EXAM:  CT CHEST   ORDERED BY: BASHIR CASSIDY     PROCEDURE DATE:  07/02/2025          INTERPRETATION:  INDICATION: Asthma exacerbation, rule out pneumonia    TECHNIQUE: A volumetric CT acquisition of the chest was obtained from the   thoracic inlet to the upper abdomen without the use of intravenous   contrast. Coronal and sagittal reconstructed images are provided.    COMPARISON: None    FINDINGS:    Lungs/Airways/Pleura: There is diffuse bronchial wall thickening with   impaction at the segmental/subsegmental level in the right middle lobe,   lingula and bilateral lower lobes. No confluent consolidation. No pleural   effusion.    Mediastinum/Lymph nodes: No thoracic adenopathy.    Heart and Vessels: The great vessels arenormal in size. There is a   common origin of the innominate artery and left common carotid artery,   normal anatomic variant. The heart is normal in size. No pericardial   effusion.    Upper Abdomen: Right renal cyst.    Osseous structures and Soft Tissues: No aggressive bone lesions.    IMPRESSION:  Bronchial wall thickening with distal impaction in the mid to lower lungs.    --- End of Report ---          < end of copied text >      
Date of Service  : 07-03-25     INTERVAL HPI/OVERNIGHT EVENTS: I feel better so want to go.   Vital Signs Last 24 Hrs  T(C): 36.6 (03 Jul 2025 12:30), Max: 36.9 (03 Jul 2025 04:31)  T(F): 97.9 (03 Jul 2025 12:30), Max: 98.4 (03 Jul 2025 04:31)  HR: 127 (03 Jul 2025 12:30) (66 - 140)  BP: 122/74 (03 Jul 2025 12:30) (122/74 - 152/72)  BP(mean): --  RR: 18 (03 Jul 2025 12:30) (18 - 20)  SpO2: 97% (03 Jul 2025 12:30) (89% - 97%)    Parameters below as of 03 Jul 2025 12:30  Patient On (Oxygen Delivery Method): room air      I&O's Summary    02 Jul 2025 07:01  -  03 Jul 2025 07:00  --------------------------------------------------------  IN: 120 mL / OUT: 0 mL / NET: 120 mL      MEDICATIONS  (STANDING):  albuterol/ipratropium for Nebulization 3 milliLiter(s) Nebulizer every 6 hours  amLODIPine   Tablet 10 milliGRAM(s) Oral daily  atorvastatin 10 milliGRAM(s) Oral at bedtime  azithromycin  IVPB 500 milliGRAM(s) IV Intermittent every 24 hours  cefTRIAXone   IVPB 1000 milliGRAM(s) IV Intermittent every 24 hours  enoxaparin Injectable 40 milliGRAM(s) SubCutaneous every 24 hours  fluticasone propionate 50 MICROgram(s)/spray Nasal Spray 1 Spray(s) Both Nostrils two times a day  fluticasone propionate/ salmeterol 250-50 MICROgram(s) Diskus 1 Dose(s) Inhalation two times a day  hydrochlorothiazide 50 milliGRAM(s) Oral daily  methylPREDNISolone sodium succinate Injectable 20 milliGRAM(s) IV Push every 8 hours  montelukast 10 milliGRAM(s) Oral at bedtime  tiotropium 2.5 MICROgram(s) Inhaler 2 Puff(s) Inhalation daily    MEDICATIONS  (PRN):    LABS:                        14.4   8.37  )-----------( 206      ( 03 Jul 2025 07:13 )             44.4     07-03    141  |  100  |  15  ----------------------------<  161[H]  3.8   |  22  |  0.64    Ca    9.4      03 Jul 2025 07:09  Mg     2.1     07-02    TPro  7.9  /  Alb  4.2  /  TBili  0.4  /  DBili  x   /  AST  25  /  ALT  24  /  AlkPhos  85  07-02      Urinalysis Basic - ( 03 Jul 2025 07:09 )    Color: x / Appearance: x / SG: x / pH: x  Gluc: 161 mg/dL / Ketone: x  / Bili: x / Urobili: x   Blood: x / Protein: x / Nitrite: x   Leuk Esterase: x / RBC: x / WBC x   Sq Epi: x / Non Sq Epi: x / Bacteria: x      CAPILLARY BLOOD GLUCOSE            Urinalysis Basic - ( 03 Jul 2025 07:09 )    Color: x / Appearance: x / SG: x / pH: x  Gluc: 161 mg/dL / Ketone: x  / Bili: x / Urobili: x   Blood: x / Protein: x / Nitrite: x   Leuk Esterase: x / RBC: x / WBC x   Sq Epi: x / Non Sq Epi: x / Bacteria: x      REVIEW OF SYSTEMS:  CONSTITUTIONAL: No fever, weight loss, or fatigue  EYES: No eye pain, visual disturbances, or discharge  ENMT:  No difficulty hearing, tinnitus, vertigo; No sinus or throat pain  NECK: No pain or stiffness  RESPIRATORY: No cough, wheezing, chills or hemoptysis; No shortness of breath  CARDIOVASCULAR: No chest pain, palpitations, dizziness, or leg swelling  GASTROINTESTINAL: No abdominal or epigastric pain. No nausea, vomiting, or hematemesis; No diarrhea or constipation. No melena or hematochezia.  GENITOURINARY: No dysuria, frequency, hematuria, or incontinence  NEUROLOGICAL: No headaches, memory loss, loss of strength, numbness, or tremors      RADIOLOGY & ADDITIONAL TESTS:    Consultant(s) Notes Reviewed:  [x ] YES  [ ] NO    PHYSICAL EXAM:  GENERAL: NAD, well-groomed, well-developed,not in any distress ,  HEAD:  Atraumatic, Normocephalic  NECK: Supple, No JVD, Normal thyroid  NERVOUS SYSTEM:  Alert & Oriented X3, No focal deficit   CHEST/LUNG: Good air entry bilateral with no  rales, rhonchi, wheezing, or rubs  HEART: Regular rate and rhythm; No murmurs, rubs, or gallops  ABDOMEN: Soft, Nontender, Nondistended; Bowel sounds present  EXTREMITIES:  2+ Peripheral Pulses, No clubbing, cyanosis, or edema    Care Discussed with Consultants/Other Providers [ x] YES  [ ] NO

## 2025-07-04 NOTE — DISCHARGE NOTE PROVIDER - NSDCCPCAREPLAN_GEN_ALL_CORE_FT
PRINCIPAL DISCHARGE DIAGNOSIS  Diagnosis: Asthma exacerbation  Assessment and Plan of Treatment: HOME CARE INSTRUCTIONS  Take medicines as directed by your caregiver.  Control your home environment in the following ways to help prevent asthma attacks:  Change your heating and air conditioning filter at least once a month.  Do not If you are on medication to help you quit smoking, be sure to take it as prescribed. Find healthy ways to deal with stress, such as exercise (check with your healthcare provider first), deep breathing, meditation, or enjoyable healthy hobbies.  Avoid situations that may cause you to smoke a cigarette.  Look for help with quitting; you are not alone. A resource to help you stop smoking is the [facility] – phone number 921-005-3380.. Do not stay in places where others are smoking.  SEEK IMMEDIATE MEDICAL CARE IF:  You are short of breath even at rest.  You get short of breath when doing very little physical activity.  You have difficulty eating, drinking, or talking due to asthma symptoms.  You have chest pain or you feel that your heart is beating fast.   You have a bluish color to your lips or fingernails.  You are lightheaded, dizzy, or faint.  You have a fever or persistent symptoms for more than 2–3 days.   You have a fever and symptoms suddenly get worse.  You seem to be getting worse and are unresponsive to treatment during an asthma attack.   Your peak flow is less than 50% of personal best.      SECONDARY DISCHARGE DIAGNOSES  Diagnosis: Acute respiratory failure with hypoxia  Assessment and Plan of Treatment: in the setting of acute asthma exacerbation, improved with treatment of acute asthma exacerbation. Follow up with your PCP Dr. Bains 1 week after discharge. Follow up with Pulmonology 1-2 weeks after discharge.     PRINCIPAL DISCHARGE DIAGNOSIS  Diagnosis: Asthma exacerbation  Assessment and Plan of Treatment: You are being discharged on an oral tapering steroid dose of Prednisone as follows:  Take 6 tablets once a day from 07/05 through 07/07, then  Take 4 tablets once a day from 07/08 through 07/10, then  Take 2 tablets once a day from 07/11 through 07/13, then  Take 1 tablet once a day until your outpatient pulmonology follow up  Continue Azithromycin as prescribed, last dose on 07/07/25  Continue Ceftin as prescribed, last dose on 07/09/25  HOME CARE INSTRUCTIONS  Take medicines as directed by your caregiver.  Control your home environment in the following ways to help prevent asthma attacks:  Change your heating and air conditioning filter at least once a month.  Do not If you are on medication to help you quit smoking, be sure to take it as prescribed. Find healthy ways to deal with stress, such as exercise (check with your healthcare provider first), deep breathing, meditation, or enjoyable healthy hobbies.  Avoid situations that may cause you to smoke a cigarette.  Look for help with quitting; you are not alone. A resource to help you stop smoking is the [facility] – phone number [phone].. Do not stay in places where others are smoking.  SEEK IMMEDIATE MEDICAL CARE IF:  You are short of breath even at rest.  You get short of breath when doing very little physical activity.  You have difficulty eating, drinking, or talking due to asthma symptoms.  You have chest pain or you feel that your heart is beating fast.   You have a bluish color to your lips or fingernails.  You are lightheaded, dizzy, or faint.  You have a fever or persistent symptoms for more than 2–3 days.   You have a fever and symptoms suddenly get worse.  You seem to be getting worse and are unresponsive to treatment during an asthma attack.   Your peak flow is less than 50% of personal best.      SECONDARY DISCHARGE DIAGNOSES  Diagnosis: Acute respiratory failure with hypoxia  Assessment and Plan of Treatment: in the setting of acute asthma exacerbation, improved with treatment of acute asthma exacerbation. Follow up with your PCP Dr. Bains 1 week after discharge. Follow up with Pulmonology 1-2 weeks after discharge.    Diagnosis: Bradycardia  Assessment and Plan of Treatment: asymptomatic while asleep, no inpatient cardiac workup needed. Follow up with Dr. Hendrix's cardiology office after discharge for further monitoring.

## 2025-07-04 NOTE — DISCHARGE NOTE PROVIDER - CARE PROVIDER_API CALL
Lisa Bains [place]  Internal Medicine  [address] [place] [address]  Phone: [phone]  Fax: [phone]  Established Patient  Follow Up Time: 1 week   Lisa Bains [place]  Internal Medicine  [address] [place] [address]  Phone: [phone]  Fax: [phone]  Established Patient  Follow Up Time: 1 week    Jaspal Florez  Pulmonary Disease  [address] [place] [address]  Phone: [phone]  Fax: [phone]  Established Patient  Follow Up Time: 1 week    Bladimir Hendrix  Cardiovascular Disease  [address] [place] [id number]  Phone: [phone]  Follow Up Time: 2 weeks

## 2025-07-04 NOTE — DISCHARGE NOTE NURSING/CASE MANAGEMENT/SOCIAL WORK - FINANCIAL ASSISTANCE
[facility] provides services at a reduced cost to those who are determined to be eligible through [facility]’s financial assistance program. Information regarding [facility]’s financial assistance program can be found by going to [email or web address] or by calling [phone].

## 2025-07-04 NOTE — DISCHARGE NOTE NURSING/CASE MANAGEMENT/SOCIAL WORK - PATIENT PORTAL LINK FT
You can access the FollowMyHealth Patient Portal offered by [facility] by registering at the following website: [email or web address]. By joining [employer]’s FollowMyHealth portal, you will also be able to view your health information using other applications (apps) compatible with our system.

## 2025-07-04 NOTE — DISCHARGE NOTE PROVIDER - HOSPITAL COURSE
HPI:   69yo F with PMH of HTN, asthma, presenting with nasal congestion, cough, and SOB for 6 days. Reports cough with yellow sputum production. Pt f/u with PCP during this time, started on zpack and taking for past 5 days. Pt also takes an inhaled steroid daily. No current PO steroids. Was referred for CXR but has not gone yet. Reports worsening SOB so came to ED. Reports she has been hospitalized before for asthma, never intubated. Denies fever/chills, CP, abdominal pain, n/v, sick contacts, recent travel. (02 Jul 2025 11:33)      Hospital Course:  CXR showed Right basilar streaky opacity, which may represent atelectasis. CT Chest no contrast showed Bronchial wall thickening with distal impaction in the mid to lower lungs. RVP negative. Pulmonology consulted, treated with empiric IV ceftriaxone and azithromycin for PNA coverage, IV solumedrol, duonebs, advair, spiriva. Required 2L nasal cannula, weaned and stable on room air by the time of discharge. Discharge plan with medication reconciliation discussed with attending  __________________. Patient is medically cleared for discharge home.      Important/Active Issues for Follow-Up:  Asthma- Primary care, Pulmonology      Medication Changes and Reason:  *****      Advance Directives:  [x] Full code [ ] Hospice [ ] DNR    Discharge Diagnoses:  Acute asthma exacerbation  AHRF   HPI:   71yo F with PMH of HTN, asthma, presenting with nasal congestion, cough, and SOB for 6 days. Reports cough with yellow sputum production. Pt f/u with PCP during this time, started on zpack and taking for past 5 days. Pt also takes an inhaled steroid daily. No current PO steroids. Was referred for CXR but has not gone yet. Reports worsening SOB so came to ED. Reports she has been hospitalized before for asthma, never intubated. Denies fever/chills, CP, abdominal pain, n/v, sick contacts, recent travel. (02 Jul 2025 11:33)      Hospital Course:  CXR showed Right basilar streaky opacity, which may represent atelectasis. CT Chest no contrast showed Bronchial wall thickening with distal impaction in the mid to lower lungs. RVP negative. Pulmonology consulted, treated with empiric IV ceftriaxone and azithromycin for PNA coverage, IV solumedrol, duonebs, advair, spiriva. Required 2L nasal cannula, weaned and stable on room air by the time of discharge. Discharge plan with medication reconciliation discussed with attending Dr. Gongora. Cardiology consulted for bradycardia on telemetry, occurred while patient was asleep, asymptomatic, outpatient cardiology followup. Patient is medically cleared for discharge home.      Important/Active Issues for Follow-Up:  Asthma- Primary care, Pulmonology  bradycardia- Cardiology    Medication Changes and Reason:  Oral azithromycin and Ceftin for pneumonia coverage  Prednisone taper and duonebs for asthma exacerbation      Advance Directives:  [x] Full code [ ] Hospice [ ] DNR    Discharge Diagnoses:  Acute asthma exacerbation  AHRF

## 2025-07-04 NOTE — DISCHARGE NOTE NURSING/CASE MANAGEMENT/SOCIAL WORK - [facility]SDCFUADDAPPT_GEN_ALL_CORE_FT
APPTS ARE READY TO BE MADE: [X] YES    Best Family or Patient Contact (if needed):    Additional Information about above appointments (if needed):    1: PCP 1 week  2: Pulm Dr. Florez  3: Cardiology    Other comments or requests:

## 2025-08-16 ENCOUNTER — INPATIENT (INPATIENT)
Facility: HOSPITAL | Age: 70
LOS: 0 days | Discharge: HOME | End: 2025-08-17
Attending: HOSPITALIST | Admitting: HOSPITALIST
Payer: MEDICARE

## 2025-08-16 VITALS
DIASTOLIC BLOOD PRESSURE: 61 MMHG | SYSTOLIC BLOOD PRESSURE: 112 MMHG | WEIGHT: 240.08 LBS | TEMPERATURE: 99 F | OXYGEN SATURATION: 99 % | HEIGHT: 65 IN | RESPIRATION RATE: 19 BRPM | HEART RATE: 89 BPM

## 2025-08-16 LAB
ALBUMIN SERPL ELPH-MCNC: 3 G/DL — LOW (ref 3.3–5)
ALP SERPL-CCNC: 90 U/L — SIGNIFICANT CHANGE UP (ref 40–120)
ALT FLD-CCNC: 21 U/L — SIGNIFICANT CHANGE UP (ref 12–78)
ANION GAP SERPL CALC-SCNC: 7 MMOL/L — SIGNIFICANT CHANGE UP (ref 5–17)
AST SERPL-CCNC: 17 U/L — SIGNIFICANT CHANGE UP (ref 15–37)
BASOPHILS # BLD AUTO: 0 K/UL — SIGNIFICANT CHANGE UP (ref 0–0.2)
BASOPHILS NFR BLD AUTO: 0 % — SIGNIFICANT CHANGE UP (ref 0–2)
BILIRUB SERPL-MCNC: 0.5 MG/DL — SIGNIFICANT CHANGE UP (ref 0.2–1.2)
BUN SERPL-MCNC: 13 MG/DL — SIGNIFICANT CHANGE UP (ref 7–23)
CALCIUM SERPL-MCNC: 9.2 MG/DL — SIGNIFICANT CHANGE UP (ref 8.5–10.1)
CHLORIDE SERPL-SCNC: 96 MMOL/L — SIGNIFICANT CHANGE UP (ref 96–108)
CO2 SERPL-SCNC: 35 MMOL/L — HIGH (ref 22–31)
CREAT SERPL-MCNC: 0.76 MG/DL — SIGNIFICANT CHANGE UP (ref 0.5–1.3)
EGFR: 85 ML/MIN/1.73M2 — SIGNIFICANT CHANGE UP
EGFR: 85 ML/MIN/1.73M2 — SIGNIFICANT CHANGE UP
EOSINOPHIL # BLD AUTO: 0.4 K/UL — SIGNIFICANT CHANGE UP (ref 0–0.5)
EOSINOPHIL NFR BLD AUTO: 5 % — SIGNIFICANT CHANGE UP (ref 0–6)
FLUAV AG NPH QL: SIGNIFICANT CHANGE UP
FLUBV AG NPH QL: SIGNIFICANT CHANGE UP
GLUCOSE SERPL-MCNC: 157 MG/DL — HIGH (ref 70–99)
HCT VFR BLD CALC: 43.7 % — SIGNIFICANT CHANGE UP (ref 34.5–45)
HGB BLD-MCNC: 14.1 G/DL — SIGNIFICANT CHANGE UP (ref 11.5–15.5)
LYMPHOCYTES # BLD AUTO: 1.62 K/UL — SIGNIFICANT CHANGE UP (ref 1–3.3)
LYMPHOCYTES # BLD AUTO: 20 % — SIGNIFICANT CHANGE UP (ref 13–44)
MAGNESIUM SERPL-MCNC: 2.2 MG/DL — SIGNIFICANT CHANGE UP (ref 1.6–2.6)
MANUAL SMEAR VERIFICATION: SIGNIFICANT CHANGE UP
MCHC RBC-ENTMCNC: 28.3 PG — SIGNIFICANT CHANGE UP (ref 27–34)
MCHC RBC-ENTMCNC: 32.3 G/DL — SIGNIFICANT CHANGE UP (ref 32–36)
MCV RBC AUTO: 87.6 FL — SIGNIFICANT CHANGE UP (ref 80–100)
MONOCYTES # BLD AUTO: 0.32 K/UL — SIGNIFICANT CHANGE UP (ref 0–0.9)
MONOCYTES NFR BLD AUTO: 4 % — SIGNIFICANT CHANGE UP (ref 2–14)
NEUTROPHILS # BLD AUTO: 5.74 K/UL — SIGNIFICANT CHANGE UP (ref 1.8–7.4)
NEUTROPHILS NFR BLD AUTO: 71 % — SIGNIFICANT CHANGE UP (ref 43–77)
NRBC # BLD: 0 /100 WBCS — SIGNIFICANT CHANGE UP (ref 0–0)
NRBC BLD AUTO-RTO: SIGNIFICANT CHANGE UP /100 WBCS (ref 0–0)
NRBC BLD-RTO: 0 /100 WBCS — SIGNIFICANT CHANGE UP (ref 0–0)
NT-PROBNP SERPL-SCNC: 251 PG/ML — HIGH (ref 0–125)
PLAT MORPH BLD: NORMAL — SIGNIFICANT CHANGE UP
PLATELET # BLD AUTO: 141 K/UL — LOW (ref 150–400)
POTASSIUM SERPL-MCNC: 3.2 MMOL/L — LOW (ref 3.5–5.3)
POTASSIUM SERPL-SCNC: 3.2 MMOL/L — LOW (ref 3.5–5.3)
PROT SERPL-MCNC: 8 GM/DL — SIGNIFICANT CHANGE UP (ref 6–8.3)
RBC # BLD: 4.99 M/UL — SIGNIFICANT CHANGE UP (ref 3.8–5.2)
RBC # FLD: 13.6 % — SIGNIFICANT CHANGE UP (ref 10.3–14.5)
RBC BLD AUTO: NORMAL — SIGNIFICANT CHANGE UP
RSV RNA NPH QL NAA+NON-PROBE: SIGNIFICANT CHANGE UP
SARS-COV-2 RNA SPEC QL NAA+PROBE: SIGNIFICANT CHANGE UP
SODIUM SERPL-SCNC: 138 MMOL/L — SIGNIFICANT CHANGE UP (ref 135–145)
SOURCE RESPIRATORY: SIGNIFICANT CHANGE UP
TROPONIN I, HIGH SENSITIVITY RESULT: 39.5 NG/L — SIGNIFICANT CHANGE UP
WBC # BLD: 8.09 K/UL — SIGNIFICANT CHANGE UP (ref 3.8–10.5)
WBC # FLD AUTO: 8.09 K/UL — SIGNIFICANT CHANGE UP (ref 3.8–10.5)

## 2025-08-16 PROCEDURE — 93010 ELECTROCARDIOGRAM REPORT: CPT

## 2025-08-16 PROCEDURE — 99285 EMERGENCY DEPT VISIT HI MDM: CPT

## 2025-08-16 PROCEDURE — 99223 1ST HOSP IP/OBS HIGH 75: CPT

## 2025-08-16 PROCEDURE — 71046 X-RAY EXAM CHEST 2 VIEWS: CPT | Mod: 26

## 2025-08-16 RX ORDER — IPRATROPIUM BROMIDE AND ALBUTEROL SULFATE .5; 2.5 MG/3ML; MG/3ML
3 SOLUTION RESPIRATORY (INHALATION) ONCE
Refills: 0 | Status: COMPLETED | OUTPATIENT
Start: 2025-08-16 | End: 2025-08-16

## 2025-08-16 RX ORDER — MAGNESIUM SULFATE 500 MG/ML
2 SYRINGE (ML) INJECTION ONCE
Refills: 0 | Status: COMPLETED | OUTPATIENT
Start: 2025-08-16 | End: 2025-08-16

## 2025-08-16 RX ORDER — AMLODIPINE BESYLATE 10 MG/1
10 TABLET ORAL ONCE
Refills: 0 | Status: COMPLETED | OUTPATIENT
Start: 2025-08-16 | End: 2025-08-16

## 2025-08-16 RX ORDER — AZITHROMYCIN 250 MG
500 CAPSULE ORAL ONCE
Refills: 0 | Status: COMPLETED | OUTPATIENT
Start: 2025-08-16 | End: 2025-08-16

## 2025-08-16 RX ORDER — IBUPROFEN 200 MG
400 TABLET ORAL EVERY 6 HOURS
Refills: 0 | Status: DISCONTINUED | OUTPATIENT
Start: 2025-08-16 | End: 2025-08-17

## 2025-08-16 RX ORDER — ATORVASTATIN CALCIUM 80 MG/1
10 TABLET, FILM COATED ORAL AT BEDTIME
Refills: 0 | Status: DISCONTINUED | OUTPATIENT
Start: 2025-08-16 | End: 2025-08-17

## 2025-08-16 RX ORDER — ENOXAPARIN SODIUM 100 MG/ML
40 INJECTION SUBCUTANEOUS EVERY 12 HOURS
Refills: 0 | Status: DISCONTINUED | OUTPATIENT
Start: 2025-08-16 | End: 2025-08-17

## 2025-08-16 RX ORDER — FLUTICASONE PROPIONATE 50 UG/1
2 SPRAY, METERED NASAL
Refills: 0 | Status: DISCONTINUED | OUTPATIENT
Start: 2025-08-16 | End: 2025-08-17

## 2025-08-16 RX ORDER — IPRATROPIUM BROMIDE AND ALBUTEROL SULFATE .5; 2.5 MG/3ML; MG/3ML
3 SOLUTION RESPIRATORY (INHALATION) EVERY 6 HOURS
Refills: 0 | Status: DISCONTINUED | OUTPATIENT
Start: 2025-08-16 | End: 2025-08-17

## 2025-08-16 RX ORDER — DEXAMETHASONE 0.5 MG/1
6 TABLET ORAL ONCE
Refills: 0 | Status: COMPLETED | OUTPATIENT
Start: 2025-08-16 | End: 2025-08-16

## 2025-08-16 RX ORDER — METHYLPREDNISOLONE ACETATE 80 MG/ML
40 INJECTION, SUSPENSION INTRA-ARTICULAR; INTRALESIONAL; INTRAMUSCULAR; SOFT TISSUE EVERY 8 HOURS
Refills: 0 | Status: DISCONTINUED | OUTPATIENT
Start: 2025-08-16 | End: 2025-08-17

## 2025-08-16 RX ORDER — MAGNESIUM, ALUMINUM HYDROXIDE 200-200 MG
30 TABLET,CHEWABLE ORAL EVERY 4 HOURS
Refills: 0 | Status: DISCONTINUED | OUTPATIENT
Start: 2025-08-16 | End: 2025-08-17

## 2025-08-16 RX ORDER — TIOTROPIUM BROMIDE INHALATION SPRAY 3.12 UG/1
2 SPRAY, METERED RESPIRATORY (INHALATION) DAILY
Refills: 0 | Status: DISCONTINUED | OUTPATIENT
Start: 2025-08-16 | End: 2025-08-17

## 2025-08-16 RX ORDER — CEFTRIAXONE 500 MG/1
1000 INJECTION, POWDER, FOR SOLUTION INTRAMUSCULAR; INTRAVENOUS ONCE
Refills: 0 | Status: COMPLETED | OUTPATIENT
Start: 2025-08-16 | End: 2025-08-16

## 2025-08-16 RX ADMIN — METHYLPREDNISOLONE ACETATE 40 MILLIGRAM(S): 80 INJECTION, SUSPENSION INTRA-ARTICULAR; INTRALESIONAL; INTRAMUSCULAR; SOFT TISSUE at 23:04

## 2025-08-16 RX ADMIN — Medication 40 MILLIEQUIVALENT(S): at 14:45

## 2025-08-16 RX ADMIN — IPRATROPIUM BROMIDE AND ALBUTEROL SULFATE 3 MILLILITER(S): .5; 2.5 SOLUTION RESPIRATORY (INHALATION) at 12:42

## 2025-08-16 RX ADMIN — Medication 150 GRAM(S): at 14:45

## 2025-08-16 RX ADMIN — CEFTRIAXONE 1000 MILLIGRAM(S): 500 INJECTION, POWDER, FOR SOLUTION INTRAMUSCULAR; INTRAVENOUS at 17:27

## 2025-08-16 RX ADMIN — ATORVASTATIN CALCIUM 10 MILLIGRAM(S): 80 TABLET, FILM COATED ORAL at 23:05

## 2025-08-16 RX ADMIN — IPRATROPIUM BROMIDE AND ALBUTEROL SULFATE 3 MILLILITER(S): .5; 2.5 SOLUTION RESPIRATORY (INHALATION) at 14:46

## 2025-08-16 RX ADMIN — Medication 255 MILLIGRAM(S): at 17:27

## 2025-08-16 RX ADMIN — Medication 100 MILLIEQUIVALENT(S): at 14:57

## 2025-08-16 RX ADMIN — DEXAMETHASONE 6 MILLIGRAM(S): 0.5 TABLET ORAL at 12:43

## 2025-08-16 RX ADMIN — IPRATROPIUM BROMIDE AND ALBUTEROL SULFATE 3 MILLILITER(S): .5; 2.5 SOLUTION RESPIRATORY (INHALATION) at 18:02

## 2025-08-17 VITALS
TEMPERATURE: 99 F | HEART RATE: 100 BPM | RESPIRATION RATE: 18 BRPM | SYSTOLIC BLOOD PRESSURE: 109 MMHG | DIASTOLIC BLOOD PRESSURE: 68 MMHG

## 2025-08-17 LAB
ANION GAP SERPL CALC-SCNC: 6 MMOL/L — SIGNIFICANT CHANGE UP (ref 5–17)
BASE EXCESS BLDA CALC-SCNC: 17.5 MMOL/L — HIGH (ref -2–3)
BLOOD GAS COMMENTS ARTERIAL: SIGNIFICANT CHANGE UP
BUN SERPL-MCNC: 14 MG/DL — SIGNIFICANT CHANGE UP (ref 7–23)
CALCIUM SERPL-MCNC: 9.2 MG/DL — SIGNIFICANT CHANGE UP (ref 8.5–10.1)
CHLORIDE SERPL-SCNC: 97 MMOL/L — SIGNIFICANT CHANGE UP (ref 96–108)
CO2 BLDA-SCNC: 47 MMOL/L — CRITICAL HIGH (ref 19–24)
CO2 SERPL-SCNC: 35 MMOL/L — HIGH (ref 22–31)
CREAT SERPL-MCNC: 0.72 MG/DL — SIGNIFICANT CHANGE UP (ref 0.5–1.3)
EGFR: 91 ML/MIN/1.73M2 — SIGNIFICANT CHANGE UP
EGFR: 91 ML/MIN/1.73M2 — SIGNIFICANT CHANGE UP
GAS PNL BLDA: SIGNIFICANT CHANGE UP
GLUCOSE SERPL-MCNC: 196 MG/DL — HIGH (ref 70–99)
HCO3 BLDA-SCNC: 45 MMOL/L — CRITICAL HIGH (ref 21–28)
HCT VFR BLD CALC: 43.2 % — SIGNIFICANT CHANGE UP (ref 34.5–45)
HGB BLD-MCNC: 13.7 G/DL — SIGNIFICANT CHANGE UP (ref 11.5–15.5)
HOROWITZ INDEX BLDA+IHG-RTO: 36 — SIGNIFICANT CHANGE UP
MAGNESIUM SERPL-MCNC: 2.5 MG/DL — SIGNIFICANT CHANGE UP (ref 1.6–2.6)
MCHC RBC-ENTMCNC: 28 PG — SIGNIFICANT CHANGE UP (ref 27–34)
MCHC RBC-ENTMCNC: 31.7 G/DL — LOW (ref 32–36)
MCV RBC AUTO: 88.2 FL — SIGNIFICANT CHANGE UP (ref 80–100)
NRBC BLD AUTO-RTO: 0 /100 WBCS — SIGNIFICANT CHANGE UP (ref 0–0)
PCO2 BLDA: 63 MMHG — HIGH (ref 32–46)
PH BLDA: 7.46 — HIGH (ref 7.35–7.45)
PHOSPHATE SERPL-MCNC: 3.6 MG/DL — SIGNIFICANT CHANGE UP (ref 2.5–4.5)
PLATELET # BLD AUTO: 150 K/UL — SIGNIFICANT CHANGE UP (ref 150–400)
PO2 BLDA: 70 MMHG — LOW (ref 83–108)
POTASSIUM SERPL-MCNC: 3.6 MMOL/L — SIGNIFICANT CHANGE UP (ref 3.5–5.3)
POTASSIUM SERPL-SCNC: 3.6 MMOL/L — SIGNIFICANT CHANGE UP (ref 3.5–5.3)
PROCALCITONIN SERPL-MCNC: 0.05 NG/ML — SIGNIFICANT CHANGE UP (ref 0.02–0.1)
RBC # BLD: 4.9 M/UL — SIGNIFICANT CHANGE UP (ref 3.8–5.2)
RBC # FLD: 13.6 % — SIGNIFICANT CHANGE UP (ref 10.3–14.5)
SAO2 % BLDA: 96.9 % — SIGNIFICANT CHANGE UP (ref 94–98)
SODIUM SERPL-SCNC: 138 MMOL/L — SIGNIFICANT CHANGE UP (ref 135–145)
WBC # BLD: 6.07 K/UL — SIGNIFICANT CHANGE UP (ref 3.8–10.5)
WBC # FLD AUTO: 6.07 K/UL — SIGNIFICANT CHANGE UP (ref 3.8–10.5)

## 2025-08-17 PROCEDURE — 99239 HOSP IP/OBS DSCHRG MGMT >30: CPT

## 2025-08-17 RX ORDER — PREDNISONE 20 MG/1
1 TABLET ORAL
Qty: 60 | Refills: 0
Start: 2025-08-17 | End: 2025-09-15

## 2025-08-17 RX ORDER — MONTELUKAST SODIUM 10 MG/1
1 TABLET ORAL
Qty: 30 | Refills: 0
Start: 2025-08-17 | End: 2025-09-15

## 2025-08-17 RX ORDER — AZITHROMYCIN 250 MG
1 CAPSULE ORAL
Qty: 5 | Refills: 0
Start: 2025-08-17 | End: 2025-08-21

## 2025-08-17 RX ADMIN — METHYLPREDNISOLONE ACETATE 40 MILLIGRAM(S): 80 INJECTION, SUSPENSION INTRA-ARTICULAR; INTRALESIONAL; INTRAMUSCULAR; SOFT TISSUE at 14:14

## 2025-08-17 RX ADMIN — METHYLPREDNISOLONE ACETATE 40 MILLIGRAM(S): 80 INJECTION, SUSPENSION INTRA-ARTICULAR; INTRALESIONAL; INTRAMUSCULAR; SOFT TISSUE at 06:29

## 2025-08-17 RX ADMIN — TIOTROPIUM BROMIDE INHALATION SPRAY 2 PUFF(S): 3.12 SPRAY, METERED RESPIRATORY (INHALATION) at 14:14

## 2025-08-17 RX ADMIN — IPRATROPIUM BROMIDE AND ALBUTEROL SULFATE 3 MILLILITER(S): .5; 2.5 SOLUTION RESPIRATORY (INHALATION) at 03:48

## 2025-08-17 RX ADMIN — FLUTICASONE PROPIONATE 2 SPRAY(S): 50 SPRAY, METERED NASAL at 06:28

## 2025-08-17 RX ADMIN — ENOXAPARIN SODIUM 40 MILLIGRAM(S): 100 INJECTION SUBCUTANEOUS at 06:29

## 2025-08-17 RX ADMIN — IPRATROPIUM BROMIDE AND ALBUTEROL SULFATE 3 MILLILITER(S): .5; 2.5 SOLUTION RESPIRATORY (INHALATION) at 10:57

## 2025-08-20 ENCOUNTER — APPOINTMENT (OUTPATIENT)
Dept: PULMONOLOGY | Facility: CLINIC | Age: 70
End: 2025-08-20

## 2025-08-23 DIAGNOSIS — J96.01 ACUTE RESPIRATORY FAILURE WITH HYPOXIA: ICD-10-CM

## 2025-08-23 DIAGNOSIS — J45.901 UNSPECIFIED ASTHMA WITH (ACUTE) EXACERBATION: ICD-10-CM

## 2025-08-23 DIAGNOSIS — Z91.013 ALLERGY TO SEAFOOD: ICD-10-CM

## 2025-08-23 DIAGNOSIS — E87.6 HYPOKALEMIA: ICD-10-CM

## 2025-08-23 DIAGNOSIS — J18.9 PNEUMONIA, UNSPECIFIED ORGANISM: ICD-10-CM

## 2025-08-23 DIAGNOSIS — Z79.899 OTHER LONG TERM (CURRENT) DRUG THERAPY: ICD-10-CM

## 2025-08-23 DIAGNOSIS — Z88.8 ALLERGY STATUS TO OTHER DRUGS, MEDICAMENTS AND BIOLOGICAL SUBSTANCES: ICD-10-CM

## 2025-08-23 DIAGNOSIS — Z79.52 LONG TERM (CURRENT) USE OF SYSTEMIC STEROIDS: ICD-10-CM

## 2025-08-23 DIAGNOSIS — I10 ESSENTIAL (PRIMARY) HYPERTENSION: ICD-10-CM
